# Patient Record
Sex: MALE | Race: WHITE | Employment: FULL TIME | URBAN - METROPOLITAN AREA
[De-identification: names, ages, dates, MRNs, and addresses within clinical notes are randomized per-mention and may not be internally consistent; named-entity substitution may affect disease eponyms.]

---

## 2017-01-20 ENCOUNTER — OFFICE VISIT (OUTPATIENT)
Dept: INTERNAL MEDICINE CLINIC | Age: 57
End: 2017-01-20

## 2017-01-20 VITALS
WEIGHT: 170 LBS | HEIGHT: 74 IN | TEMPERATURE: 97.5 F | DIASTOLIC BLOOD PRESSURE: 79 MMHG | SYSTOLIC BLOOD PRESSURE: 120 MMHG | HEART RATE: 65 BPM | OXYGEN SATURATION: 100 % | RESPIRATION RATE: 16 BRPM | BODY MASS INDEX: 21.82 KG/M2

## 2017-01-20 DIAGNOSIS — Z00.00 ANNUAL PHYSICAL EXAM: Primary | ICD-10-CM

## 2017-01-20 NOTE — PROGRESS NOTES
1. Have you been to the ER, urgent care clinic since your last visit? Hospitalized since your last visit?no    2. Have you seen any other [facility] of [facility] since your last visit?no

## 2017-01-20 NOTE — PROGRESS NOTES
SUBJECTIVE:   Luccallume Paula. Ava Stiles is a 49 y.o. male who is here for complete physical exam. Pt is fasting today. Pt report feeling like there is something in his eyes. Pt endorses seeing ophthalmology where he was dx with meibomian gland disorder. Pt states he has been using a prescription eye drop QID x one year. Pt notes his eyes continue to feel dry despite using the medication. Pt denies f/u with ophthalmology. Pt notes his ophthalmologist has retired, but another physician has taken over his patients. Pt reports issues using contacts. Pt is asking about macular degeneration. Pt denies palpitations or cp. Pt saw Dr. Jax Lo (cardiology) in 2015. Pt claims he cannot recall them requiring a f/u. Pt reports receiving a large bill due to XR from last year because his XR was done at the hospital. Pt recalls being told that the hospital was the most expensive place to have an XR. Pt is requesting future XRs not be done at the hospital.     Pt is asking about normal screening for TB. Pt denies known exposure. Pt states he cannot remember his last screening for PSA. Pt reports being dx with sinusitis on 11/29/16. Pt notes he still has a mild cough. Pt states he has a mole near his right temple that he is concerned about. Pt reports drinking lots of craft beers. PREVENTIVE:  Colonoscopy: due 12/18/2017, Dr. Shruthi Ryan. PSA: ordered today  Tdap: may have been done at an ED, but pt cannot remember the date. Zostavax. Prescribed today    Hep C: ordered today    Pt specifically denies changes in vision or hearing, trouble with swallowing or taste, CP, SOB, heartburn or upset stomach, change in bowel habits, problems urinating, unusual joint or muscle pains, numbness or tingling in extremities, or skin lesions of concern. At this time, he is otherwise doing well and has brought no other complaints to my attention today.  For a list of the medical issues addressed today, see the assessment and plan below. PMH:   Past Medical History   Diagnosis Date    Calculus of kidney     Heart failure ([facility] [address]     Hyperlipidemia 6/24/2010    Hyperlipidemia 6/24/2010    Migraine 6/1/2011    Nephrolithiasis 5/26/2010    Other ill-defined conditions(799.89)      etoh abuse    PAF (paroxysmal atrial fibrillation) (HCC)     Proteinuria 5/26/2010    Psychiatric disorder      depression       PSH:  has a past surgical history that includes cardiac surg procedure unlist and orthopaedic. Allergies: is allergic to pollen extracts. Meds:   Current Outpatient Prescriptions   Medication Sig    varicella zoster vacine live (ZOSTAVAX) 19,400 unit/0.65 mL susr injection 1 Vial by SubCUTAneous route once for 1 dose.  diph,Pertuss,Acell,,Tet Vac-PF (ADACEL) 2 Lf-(2.5-5-3-5 mcg)-5Lf/0.5 mL susp 0.5 mL by IntraMUSCular route once for 1 dose.  DOCOSAHEXANOIC ACID/EPA (FISH OIL PO) Take  by mouth.  ibuprofen (MOTRIN) 200 mg tablet Take  by mouth.  acetaminophen (TYLENOL) 500 mg tablet Take  by mouth every six (6) hours as needed for Pain. No current facility-administered medications for this visit. Fam hx: family history includes Bleeding Prob in his mother; Heart Attack in his father; Heart Disease (age of onset: 62) in his father; Parkinsonism in his father. Soc hx:  reports that he has never smoked. He has never used smokeless tobacco. He reports that he drinks about 3.0 oz of alcohol per week  He reports that he does not use illicit drugs.       Review of Systems - History obtained from the patient  General ROS: negative  Psychological ROS: negative  Ophthalmic ROS: negative  ENT ROS: negative  Respiratory ROS: no cough, shortness of breath, or wheezing  Cardiovascular ROS: no chest pain or dyspnea on exertion  Gastrointestinal ROS: no abdominal pain, change in bowel habits, or black or bloody stools  Genito-Urinary ROS: negative  Musculoskeletal ROS: negative  Neurological ROS: negative  Dermatological ROS: negative    OBJECTIVE:   Vitals:   Visit Vitals    /79 (BP 1 Location: Left arm, BP Patient Position: Sitting)    Pulse 65    Temp 97.5 °F (36.4 °C) (Oral)    Resp 16    Ht 6' 1.5\" (1.867 m)    Wt 170 lb (77.1 kg)    SpO2 100%    BMI 22.12 kg/m2     Gen: Pleasant 49 y.o. male in NAD. HEENT: PERRLA. EOMI. OP moist and pink. EARS: TMs normal and canals equal bilaterally. NECK: Supple. No LAD. No thyromegaly. HEART: RRR, No M/G/R.   LUNGS: CTAB No W/R. ABDOMEN: S, NT, ND, BS+. EXTREMITIES: Warm. No C/C/E.  MUSCULOSKELETAL: Normal ROM, muscle strength 5/5 all groups. NEURO: Alert and oriented x 3. Cranial nerves grossly intact. No focal sensory or motor deficits noted. SKIN: Warm. Dry. No rashes or other lesions noted. ASSESSMENT/ PLAN:     Chon Keane was seen today for physical, other and other. Diagnoses and all orders for this visit:    Annual physical exam  -     CBC WITH AUTOMATED DIFF  -     LIPID PANEL  -     METABOLIC PANEL, COMPREHENSIVE  -     T4, FREE  -     TSH 3RD GENERATION  -     PSA - PROSTATE SPECIFIC AG  -     HEPATITIS C AB  -     REFERRAL TO OPHTHALMOLOGY  -     varicella zoster vacine live (ZOSTAVAX) 19,400 unit/0.65 mL susr injection; 1 Vial by SubCUTAneous route once for 1 dose.  -     diph,Pertuss,Acell,,Tet Vac-PF (ADACEL) 2 Lf-(2.5-5-3-5 mcg)-5Lf/0.5 mL susp; 0.5 mL by IntraMUSCular route once for 1 dose.  -     REFERRAL TO DERMATOLOGY      Pt was advised to f/u with ophthalmology for macular degeneration evaluation. I prescribed Adacel and Zostavax for routine vaccination. Pt was instructed to ask about the cost of these immunizations at his pharmacy before he receives them. Pt was referred to Dr. Hossein Morton (dermatology) for skin exam.     I prescribed Zostavax for routine vaccination. Pt was referred to Dr. Brittany Starks (ophtahlmology). Kizzie Babinski Chuck's physical exam was normal and urinalysis was clear.  Pt was given lab orders for a CBC, CMP, lipid panel, T4, Hep C, PSA, and TSH to have done when he is fasting. Pt will f/u in one year for CPE/fasing labs. Follow-up Disposition:  Return in about 1 year (around 1/20/2018) for CPE/fasting lab. I have reviewed the patient's medications and risks/side effects/benefits were discussed. Diagnosis(-es) explained to patient and questions answered. Literature provided where appropriate.      Written by Yomaira Grimaldo, as dictated by Brian Mclaughlin MD.

## 2017-01-20 NOTE — MR AVS SNAPSHOT
Visit Information Date & Time Provider Department Dept. Phone Encounter #  
 1/20/2017 12:00 PM Jamari Grullon, [address] [place] [address] [id number] Follow-up Instructions Return in about 1 year (around 1/20/2018) for CPE/fasting lab. Upcoming Health Maintenance Date Due Hepatitis C Screening 1960 DTaP/Tdap/Td series (1 - Tdap) 9/28/1983 INFLUENZA AGE 9 TO ADULT 8/1/2016 COLONOSCOPY 12/18/2017 Allergies as of 1/20/2017  Review Complete On: 1/20/2017 By: Jamari Grullon MD  
  
 Severity Noted Reaction Type Reactions Pollen Extracts  05/26/2010    Runny Nose Current Immunizations  Reviewed on 6/23/2012 No immunizations on file. Not reviewed this visit You Were Diagnosed With   
  
 Codes Comments Annual physical exam    -  Primary ICD-10-CM: Z00.00 ICD-9-CM: V70.0 Vitals BP Pulse Temp Resp Height(growth percentile) Weight(growth percentile) 120/79 (BP 1 Location: Left arm, BP Patient Position: Sitting) 65 97.5 °F (36.4 °C) (Oral) 16 6' 1.5\" (1.867 m) 170 lb (77.1 kg) SpO2 BMI Smoking Status 100% 22.12 kg/m2 Never Smoker BMI and BSA Data Body Mass Index Body Surface Area  
 22.12 kg/m 2 2 m 2 Preferred Pharmacy Pharmacy Name Phone [id number] [place], [facility] [phone] Your Updated Medication List  
  
   
This list is accurate as of: 1/20/17  1:09 PM.  Always use your most recent med list.  
  
  
  
  
 acetaminophen 500 mg tablet Commonly known as:  TYLENOL Take  by mouth every six (6) hours as needed for Pain. diph,Pertuss(Acell),Tet Vac-PF 2 Lf-(2.5-5-3-5 mcg)-5Lf/0.5 mL susp Commonly known as:  ADACEL  
0.5 mL by IntraMUSCular route once for 1 dose. FISH OIL PO Take  by mouth. ibuprofen 200 mg tablet Commonly known as:  MOTRIN Take  by mouth. varicella zoster vacine live 19,400 unit/0.65 mL Susr injection Commonly known as:  ZOSTAVAX  
1 Vial by SubCUTAneous route once for 1 dose. Prescriptions Sent to Pharmacy Refills  
 varicella zoster vacine live (ZOSTAVAX) 19,400 unit/0.65 mL susr injection 0 Si Vial by SubCUTAneous route once for 1 dose. Class: Normal  
 Pharmacy: [id number]- [address] Ph #: [phone] Route: SubCUTAneous diph,Pertuss,Acell,,Tet Vac-PF (ADACEL) 2 Lf-(2.5-5-3-5 mcg)-5Lf/0.5 mL susp 0 Si.5 mL by IntraMUSCular route once for 1 dose. Class: Normal  
 Pharmacy: [facility] #[id number]- [address] Ph #: [phone] Route: IntraMUSCular We Performed the Following CBC WITH AUTOMATED DIFF [45616 CPT(R)] HEPATITIS C AB [93467 CPT(R)] LIPID PANEL [73317 CPT(R)] METABOLIC PANEL, COMPREHENSIVE [82883 CPT(R)] PSA DIAGNOSTIC (PROSTATIC SPECIFIC AG) [id number] CPT(R)] REFERRAL TO DERMATOLOGY [REF19 Custom] Comments:  
 Please evaluate patient for atypical nevi REFERRAL TO OPHTHALMOLOGY [REF57 Custom] Comments:  
 Please evaluate patient for dry eyes. T4, FREE [id number] CPT(R)] TSH 3RD GENERATION [24447 CPT(R)] Follow-up Instructions Return in about 1 year (around 2018) for CPE/fasting lab. Referral Information Referral ID Referred By Referred To  
  
 [id number] [facility] [address], [address] [place] [address] Phone: [phone] Fax: [phone] Visits Status Start Date End Date 1 New Request 17 If your referral has a status of pending review or denied, additional information will be sent to support the outcome of this decision.   
 Referral ID Referred By Referred To  
 [id number] Cristian Eaton MD  
 [facility] [place] Phone: [phone] Fax: [phone] Visits Status Start Date End Date 1 New Request 1/20/17 1/20/18 If your referral has a status of pending review or denied, additional information will be sent to support the outcome of this decision. Introducing [facility]! Dear Perla Villagomez: Thank you for requesting a [employer] account. Our records indicate that you already have an active [employer] account. You can access your account anytime at [email or web address] Did you know that you can access your hospital and ER discharge instructions at any time in [employer]? You can also review all of your test results from your hospital stay or ER visit. Additional Information If you have questions, please visit the Frequently Asked Questions section of the [employer] website at [email or web address] Remember, [employer] is NOT to be used for urgent needs. For medical emergencies, dial 911. Now available from your iPhone and Android! Please provide this summary of care documentation to your next provider. Your primary care clinician is listed as Yosvany Valenzuela. If you have any questions after today's visit, please call [phone].

## 2017-01-21 LAB
ALBUMIN SERPL-MCNC: 4.7 G/DL (ref 3.5–5.5)
ALBUMIN/GLOB SERPL: 2 {RATIO} (ref 1.1–2.5)
ALP SERPL-CCNC: 39 IU/L (ref 39–117)
ALT SERPL-CCNC: 23 IU/L (ref 0–44)
AST SERPL-CCNC: 18 IU/L (ref 0–40)
BASOPHILS # BLD AUTO: 0 X10E3/UL (ref 0–0.2)
BASOPHILS NFR BLD AUTO: 1 %
BILIRUB SERPL-MCNC: 0.8 MG/DL (ref 0–1.2)
BUN SERPL-MCNC: 16 MG/DL (ref 6–24)
BUN/CREAT SERPL: 19 (ref 9–20)
CALCIUM SERPL-MCNC: 9.5 MG/DL (ref 8.7–10.2)
CHLORIDE SERPL-SCNC: 102 MMOL/L (ref 96–106)
CHOLEST SERPL-MCNC: 232 MG/DL (ref 100–199)
CO2 SERPL-SCNC: 26 MMOL/L (ref 18–29)
CREAT SERPL-MCNC: 0.85 MG/DL (ref 0.76–1.27)
EOSINOPHIL # BLD AUTO: 0.2 X10E3/UL (ref 0–0.4)
EOSINOPHIL NFR BLD AUTO: 4 %
ERYTHROCYTE [DISTWIDTH] IN BLOOD BY AUTOMATED COUNT: 13.2 % (ref 12.3–15.4)
GLOBULIN SER CALC-MCNC: 2.3 G/DL (ref 1.5–4.5)
GLUCOSE SERPL-MCNC: 83 MG/DL (ref 65–99)
HCT VFR BLD AUTO: 42.7 % (ref 37.5–51)
HCV AB S/CO SERPL IA: <0.1 S/CO RATIO (ref 0–0.9)
HDLC SERPL-MCNC: 59 MG/DL
HGB BLD-MCNC: 14.2 G/DL (ref 12.6–17.7)
IMM GRANULOCYTES # BLD: 0 X10E3/UL (ref 0–0.1)
IMM GRANULOCYTES NFR BLD: 0 %
LDLC SERPL CALC-MCNC: 159 MG/DL (ref 0–99)
LYMPHOCYTES # BLD AUTO: 1.7 X10E3/UL (ref 0.7–3.1)
LYMPHOCYTES NFR BLD AUTO: 33 %
MCH RBC QN AUTO: 30 PG (ref 26.6–33)
MCHC RBC AUTO-ENTMCNC: 33.3 G/DL (ref 31.5–35.7)
MCV RBC AUTO: 90 FL (ref 79–97)
MONOCYTES # BLD AUTO: 0.4 X10E3/UL (ref 0.1–0.9)
MONOCYTES NFR BLD AUTO: 8 %
NEUTROPHILS # BLD AUTO: 2.8 X10E3/UL (ref 1.4–7)
NEUTROPHILS NFR BLD AUTO: 54 %
PLATELET # BLD AUTO: 296 X10E3/UL (ref 150–379)
POTASSIUM SERPL-SCNC: 5.2 MMOL/L (ref 3.5–5.2)
PROT SERPL-MCNC: 7 G/DL (ref 6–8.5)
PSA SERPL-MCNC: 2.4 NG/ML (ref 0–4)
RBC # BLD AUTO: 4.74 X10E6/UL (ref 4.14–5.8)
SODIUM SERPL-SCNC: 142 MMOL/L (ref 134–144)
T4 FREE SERPL-MCNC: 1.36 NG/DL (ref 0.82–1.77)
TRIGL SERPL-MCNC: 72 MG/DL (ref 0–149)
TSH SERPL DL<=0.005 MIU/L-ACNC: 2.05 UIU/ML (ref 0.45–4.5)
VLDLC SERPL CALC-MCNC: 14 MG/DL (ref 5–40)
WBC # BLD AUTO: 5.1 X10E3/UL (ref 3.4–10.8)

## 2017-01-27 NOTE — PROGRESS NOTES
Called and spoke with pt informing labs normal except for lipid panel;     Pt stated he saw his results on MyChart and has discussed this with his wife and would prefer to work on diet and exercise. Pt requesting call back if Dr. Clement White would like for him to have repeat lipid panel in the next 3-6 months to see if there has been improvement. Pt advised question will be forwarded to Dr. Clement White and will let him know if she wishes to order future repeat lab study.

## 2017-01-27 NOTE — PROGRESS NOTES
All results are in the normal range except for his lipid panel. Levels are higher than last year. Is he willing to take a medication?

## 2017-02-07 ENCOUNTER — OFFICE VISIT (OUTPATIENT)
Dept: INTERNAL MEDICINE CLINIC | Age: 57
End: 2017-02-07

## 2017-02-07 VITALS
WEIGHT: 167 LBS | OXYGEN SATURATION: 98 % | RESPIRATION RATE: 18 BRPM | DIASTOLIC BLOOD PRESSURE: 76 MMHG | SYSTOLIC BLOOD PRESSURE: 109 MMHG | HEART RATE: 75 BPM | HEIGHT: 74 IN | TEMPERATURE: 98 F | BODY MASS INDEX: 21.43 KG/M2

## 2017-02-07 DIAGNOSIS — J11.1 INFLUENZA: Primary | ICD-10-CM

## 2017-02-07 RX ORDER — OSELTAMIVIR PHOSPHATE 75 MG/1
75 CAPSULE ORAL 2 TIMES DAILY
Qty: 10 CAP | Refills: 0 | Status: SHIPPED | OUTPATIENT
Start: 2017-02-07 | End: 2017-02-12

## 2017-02-07 RX ORDER — AZITHROMYCIN 250 MG/1
250 TABLET, FILM COATED ORAL SEE ADMIN INSTRUCTIONS
Qty: 6 TAB | Refills: 0 | Status: SHIPPED | OUTPATIENT
Start: 2017-02-07 | End: 2017-02-12

## 2017-02-07 RX ORDER — HYDROCODONE POLISTIREX AND CHLORPHENIRAMINE POLISTIREX 10; 8 MG/5ML; MG/5ML
1 SUSPENSION, EXTENDED RELEASE ORAL
Qty: 160 ML | Refills: 0 | Status: SHIPPED | OUTPATIENT
Start: 2017-02-07 | End: 2017-06-12

## 2017-02-07 NOTE — PROGRESS NOTES
1. Have you been to the ER, urgent care clinic since your last visit? Hospitalized since your last visit?no    2. Have you seen or consulted any other health care providers outside of the [address] [place] [address] since your last visit? Include any pap smears or colon screening.  no

## 2017-02-07 NOTE — PROGRESS NOTES
SUBJECTIVE:   Mr. Brandt Galeas is a 49 y.o. male who is here c/o fever. Pt c/o nasal drainage, nasal congestion, sneezing, cough, HA, fever, and body aches x 4 days. Pt claims his wife is not showing signs of the flu, but has allergies. Pt reports episodes of nausea. Pt denies getting a flu vaccine. At this time, he is otherwise doing well and has brought no other complaints to my attention today. For a list of the medical issues addressed today, see the assessment and plan below. PMH:   Past Medical History   Diagnosis Date    Calculus of kidney     Heart failure ([facility] [address]     Hyperlipidemia 6/24/2010    Hyperlipidemia 6/24/2010    Migraine 6/1/2011    Nephrolithiasis 5/26/2010    Other ill-defined conditions(799.89)      etoh abuse    PAF (paroxysmal atrial fibrillation) (HCC)     Proteinuria 5/26/2010    Psychiatric disorder      depression     PSH:  has a past surgical history that includes cardiac surg procedure unlist and orthopaedic. All: is allergic to pollen extracts. MEDS:   Current Outpatient Prescriptions   Medication Sig    oseltamivir (TAMIFLU) 75 mg capsule Take 1 Cap by mouth two (2) times a day for 5 days. Indications: INFLUENZA    azithromycin (ZITHROMAX) 250 mg tablet Take 1 Tab by mouth See Admin Instructions for 5 days.  chlorpheniramine-HYDROcodone (TUSSIONEX) 10-8 mg/5 mL suspension Take 5 mL by mouth every twelve (12) hours as needed for Cough. Max Daily Amount: 10 mL.  DOCOSAHEXANOIC ACID/EPA (FISH OIL PO) Take  by mouth.  ibuprofen (MOTRIN) 200 mg tablet Take  by mouth.  acetaminophen (TYLENOL) 500 mg tablet Take  by mouth every six (6) hours as needed for Pain. No current facility-administered medications for this visit. FH: family history includes Bleeding Prob in his mother; Heart Attack in his father; Heart Disease (age of onset: 62) in his father; Parkinsonism in his father. SH:  reports that he has never smoked.  He has never used smokeless tobacco. He reports that he drinks about 3.0 oz of alcohol per week  He reports that he does not use illicit drugs. Review of Systems - History obtained from the patient  General ROS: +HAs, +fever, +body aches, otherwise negative  Psychological ROS: negative  Ophthalmic ROS: negative  ENT ROS: +nasal drainage, +nasal congestion, +sneezing, otherwise negative  Respiratory ROS: +cough, no shortness of breath, or wheezing  Cardiovascular ROS: no chest pain or dyspnea on exertion  Gastrointestinal ROS: no abdominal pain, change in bowel habits, or black or bloody stools  Genito-Urinary ROS: negative  Musculoskeletal ROS: negative  Neurological ROS: negative  Dermatological ROS: negative    OBJECTIVE:   Vitals:   Visit Vitals    /76 (BP 1 Location: Left arm, BP Patient Position: Sitting)    Pulse 75    Temp 98 °F (36.7 °C) (Oral)    Resp 18    Ht 6' 1.5\" (1.867 m)    Wt 167 lb (75.8 kg)    SpO2 98%    BMI 21.73 kg/m2      Gen: Pleasant 49 y.o.  male in NAD. HEENT: NC/AT. HEAD: No maxillary or frontal sinus tenderness. EYES: PERRLA. EOMI. EARS: TM's normal and canals equal bilaterally. No erythema or bulging. OP moist and pink. NARES: Mucosa pink and turbinates normal bilaterally. THROAT: No erythema or exudate. NECK: Supple. No LAD. No thyromegaly. HEART: RRR, No M/G/R. LUNGS: CTAB No W/R. EXTREMITIES: Warm. No C/C/E. NEURO: Alert and oriented x 3. Cranial nerves grossly intact. No focal sensory or motor deficits noted. SKIN: Warm. Dry. No rashes or other lesions noted. ASSESSMENT/ PLAN:     Adalgisa Rangel was seen today for uri and other. Diagnoses and all orders for this visit:    Influenza  -     oseltamivir (TAMIFLU) 75 mg capsule; Take 1 Cap by mouth two (2) times a day for 5 days. Indications: INFLUENZA  -     azithromycin (ZITHROMAX) 250 mg tablet; Take 1 Tab by mouth See Admin Instructions for 5 days. -     chlorpheniramine-HYDROcodone (TUSSIONEX) 10-8 mg/5 mL suspension;  Take 5 mL by mouth every twelve (12) hours as needed for Cough. Max Daily Amount: 10 mL. I ordered a rapid flu that returned positive. I prescribed Tamiflu, a Z-pack, and Tussionex 5ml BID prn for cough for management of flu. Pt may use Robitussin cough syrup during the day and Tussionex at night. Pt was given a note to return to work on 2/13/17. Pt was instructed to rest and hydrate. I also prescribed Tamiflu daily for pt's wife. Pt will f/u if symptoms worsen or fail to improve. Follow-up Disposition:  Return if symptoms worsen or fail to improve. I have reviewed the patient's medications and risks/side effects/benefits were discussed. Diagnosis(-es) explained to patient and questions answered. Literature provided where appropriate.      Written by Beata Reese, as dictated by Deon Johnson MD.

## 2017-02-07 NOTE — LETTER
NOTIFICATION RETURN TO WORK / SCHOOL 
 
2/7/2017 1:10 PM 
 
Mr. Savage Maguire [address] [id number] To Whom It May Concern: 
 
Savage Maguire is currently under the care of [facility]. He will return to work/school on: 2/13/17. If there are questions or concerns please have the patient contact our office. Sincerely, Casie Hinojosa MD

## 2017-06-12 ENCOUNTER — HOSPITAL ENCOUNTER (EMERGENCY)
Age: 57
Discharge: HOME | End: 2017-06-12
Attending: EMERGENCY MEDICINE
Payer: COMMERCIAL

## 2017-06-12 ENCOUNTER — APPOINTMENT (OUTPATIENT)
Dept: GENERAL RADIOLOGY | Age: 57
End: 2017-06-12
Attending: EMERGENCY MEDICINE
Payer: COMMERCIAL

## 2017-06-12 VITALS
TEMPERATURE: 97.9 F | DIASTOLIC BLOOD PRESSURE: 55 MMHG | RESPIRATION RATE: 18 BRPM | HEIGHT: 74 IN | WEIGHT: 159.83 LBS | OXYGEN SATURATION: 98 % | HEART RATE: 60 BPM | BODY MASS INDEX: 20.51 KG/M2 | SYSTOLIC BLOOD PRESSURE: 115 MMHG

## 2017-06-12 DIAGNOSIS — R07.89 ATYPICAL CHEST PAIN: Primary | ICD-10-CM

## 2017-06-12 LAB
ALBUMIN SERPL BCP-MCNC: 3.6 G/DL (ref 3.5–5)
ALBUMIN/GLOB SERPL: 1 {RATIO} (ref 1.1–2.2)
ALP SERPL-CCNC: 47 U/L (ref 45–117)
ALT SERPL-CCNC: 35 U/L (ref 12–78)
ANION GAP BLD CALC-SCNC: 6 MMOL/L (ref 5–15)
AST SERPL W P-5'-P-CCNC: 24 U/L (ref 15–37)
ATRIAL RATE: 67 BPM
BASOPHILS # BLD AUTO: 0 K/UL (ref 0–0.1)
BASOPHILS # BLD: 0 % (ref 0–1)
BILIRUB SERPL-MCNC: 0.7 MG/DL (ref 0.2–1)
BUN SERPL-MCNC: 17 MG/DL (ref 6–20)
BUN/CREAT SERPL: 17 (ref 12–20)
CALCIUM SERPL-MCNC: 8.7 MG/DL (ref 8.5–10.1)
CALCULATED P AXIS, ECG09: 80 DEGREES
CALCULATED R AXIS, ECG10: 98 DEGREES
CALCULATED T AXIS, ECG11: 44 DEGREES
CHLORIDE SERPL-SCNC: 105 MMOL/L (ref 97–108)
CK MB CFR SERPL CALC: 1.3 % (ref 0–2.5)
CK MB SERPL-MCNC: 1.5 NG/ML (ref 5–25)
CK SERPL-CCNC: 117 U/L (ref 39–308)
CO2 SERPL-SCNC: 28 MMOL/L (ref 21–32)
CREAT SERPL-MCNC: 0.99 MG/DL (ref 0.7–1.3)
D DIMER PPP FEU-MCNC: 0.4 MG/L FEU (ref 0–0.65)
DIAGNOSIS, 93000: NORMAL
EOSINOPHIL # BLD: 0.2 K/UL (ref 0–0.4)
EOSINOPHIL NFR BLD: 3 % (ref 0–7)
ERYTHROCYTE [DISTWIDTH] IN BLOOD BY AUTOMATED COUNT: 12.9 % (ref 11.5–14.5)
GLOBULIN SER CALC-MCNC: 3.7 G/DL (ref 2–4)
GLUCOSE SERPL-MCNC: 90 MG/DL (ref 65–100)
HCT VFR BLD AUTO: 43.3 % (ref 36.6–50.3)
HGB BLD-MCNC: 15.3 G/DL (ref 12.1–17)
LIPASE SERPL-CCNC: 214 U/L (ref 73–393)
LYMPHOCYTES # BLD AUTO: 21 % (ref 12–49)
LYMPHOCYTES # BLD: 1.6 K/UL (ref 0.8–3.5)
MCH RBC QN AUTO: 30.2 PG (ref 26–34)
MCHC RBC AUTO-ENTMCNC: 35.3 G/DL (ref 30–36.5)
MCV RBC AUTO: 85.6 FL (ref 80–99)
MONOCYTES # BLD: 0.4 K/UL (ref 0–1)
MONOCYTES NFR BLD AUTO: 6 % (ref 5–13)
NEUTS SEG # BLD: 5.1 K/UL (ref 1.8–8)
NEUTS SEG NFR BLD AUTO: 70 % (ref 32–75)
P-R INTERVAL, ECG05: 138 MS
PLATELET # BLD AUTO: 258 K/UL (ref 150–400)
POTASSIUM SERPL-SCNC: 4.1 MMOL/L (ref 3.5–5.1)
PROT SERPL-MCNC: 7.3 G/DL (ref 6.4–8.2)
Q-T INTERVAL, ECG07: 410 MS
QRS DURATION, ECG06: 114 MS
QTC CALCULATION (BEZET), ECG08: 433 MS
RBC # BLD AUTO: 5.06 M/UL (ref 4.1–5.7)
SODIUM SERPL-SCNC: 139 MMOL/L (ref 136–145)
TROPONIN I SERPL-MCNC: <0.04 NG/ML
TROPONIN I SERPL-MCNC: <0.04 NG/ML
VENTRICULAR RATE, ECG03: 67 BPM
WBC # BLD AUTO: 7.3 K/UL (ref 4.1–11.1)

## 2017-06-12 PROCEDURE — 80053 COMPREHEN METABOLIC PANEL: CPT | Performed by: EMERGENCY MEDICINE

## 2017-06-12 PROCEDURE — 85025 COMPLETE CBC W/AUTO DIFF WBC: CPT | Performed by: EMERGENCY MEDICINE

## 2017-06-12 PROCEDURE — 85379 FIBRIN DEGRADATION QUANT: CPT | Performed by: EMERGENCY MEDICINE

## 2017-06-12 PROCEDURE — 83690 ASSAY OF LIPASE: CPT | Performed by: EMERGENCY MEDICINE

## 2017-06-12 PROCEDURE — 71020 XR CHEST PA LAT: CPT

## 2017-06-12 PROCEDURE — 82550 ASSAY OF CK (CPK): CPT | Performed by: EMERGENCY MEDICINE

## 2017-06-12 PROCEDURE — 82553 CREATINE MB FRACTION: CPT | Performed by: EMERGENCY MEDICINE

## 2017-06-12 PROCEDURE — 84484 ASSAY OF TROPONIN QUANT: CPT | Performed by: EMERGENCY MEDICINE

## 2017-06-12 PROCEDURE — 93005 ELECTROCARDIOGRAM TRACING: CPT

## 2017-06-12 PROCEDURE — 36415 COLL VENOUS BLD VENIPUNCTURE: CPT | Performed by: EMERGENCY MEDICINE

## 2017-06-12 PROCEDURE — 99284 EMERGENCY DEPT VISIT MOD MDM: CPT

## 2017-06-12 RX ORDER — PANTOPRAZOLE SODIUM 40 MG/10ML
40 INJECTION, POWDER, LYOPHILIZED, FOR SOLUTION INTRAVENOUS
Status: DISCONTINUED | OUTPATIENT
Start: 2017-06-12 | End: 2017-06-12

## 2017-06-12 RX ORDER — LIDOCAINE HYDROCHLORIDE 20 MG/ML
10 SOLUTION OROPHARYNGEAL
Status: DISCONTINUED | OUTPATIENT
Start: 2017-06-12 | End: 2017-06-12

## 2017-06-12 NOTE — DISCHARGE INSTRUCTIONS
Chest Pain: Care Instructions  Your Care Instructions  There are many things that can cause chest pain. Some are not serious and will get better on their own in a few days. But some kinds of chest pain need more testing and treatment. Your doctor may have recommended a follow-up visit in the next 8 to 12 hours. If you are not getting better, you may need more tests or treatment. Even though your doctor has released you, you still need to watch for any problems. The doctor carefully checked you, but sometimes problems can develop later. If you have new symptoms or if your symptoms do not get better, get medical care right away. If you have worse or different chest pain or pressure that lasts more than 5 minutes or you passed out (lost consciousness), call 911 or seek other emergency help right away. A medical visit is only one step in your treatment. Even if you feel better, you still need to do what your doctor recommends, such as going to all suggested follow-up appointments and taking medicines exactly as directed. This will help you recover and help prevent future problems. How can you care for yourself at home? · Rest until you feel better. · Take your medicine exactly as prescribed. Call your doctor if you think you are having a problem with your medicine. · Do not drive after taking a prescription pain medicine. When should you call for help? Call 911 if:  · You passed out (lost consciousness). · You have severe difficulty breathing. · You have symptoms of a heart attack. These may include:  ¨ Chest pain or pressure, or a strange feeling in your chest.  ¨ Sweating. ¨ Shortness of breath. ¨ Nausea or vomiting. ¨ Pain, pressure, or a strange feeling in your back, neck, jaw, or upper belly or in one or both shoulders or arms. ¨ Lightheadedness or sudden weakness. ¨ A fast or irregular heartbeat.   After you call 911, the  may tell you to chew 1 adult-strength or 2 to 4 low-dose aspirin. Wait for an ambulance. Do not try to drive yourself. Call your doctor today if:  · You have any trouble breathing. · Your chest pain gets worse. · You are dizzy or lightheaded, or you feel like you may faint. · You are not getting better as expected. · You are having new or different chest pain. Where can you learn more? Go to http://hu-wild.info/. Enter A120 in the search box to learn more about \"Chest Pain: Care Instructions. \"  Current as of: May 27, 2016  Content Version: 11.2  © 9752-9776 [employer]. Care instructions adapted under license by [employer] (which disclaims liability or warranty for this information). If you have questions about a medical condition or this instruction, always ask your healthcare professional. [address] any warranty or liability for your use of this information.

## 2017-06-12 NOTE — ED PROVIDER NOTES
HPI Comments: Jonathon Braden is a 49 y.o. male with PMHx significant for Heart failure and hyperlipidemia, who presents ambulatory to ED [facility] ED with cc of nonradiating left chest pain since 5 AM today. Pt also complains of associated SOB, generalized shaking and left calf pain. Chest discomfort has improving since waking. He denies chest pain in the last few weeks, but has experienced similar symptoms that were more severe associated with PAF five years ago. Pt notes he had a surgery for coarctation of the aorta when he was 10, and ever since has regular cardiology follow up. He denies any hx of MI, abdominal surgeries, or any recent travel. Pt also denies any N/V/D, abdominal pain, weakness or tingling/numbess in face/extremities, diaphoresis, hemoptysis or cough. Ankit Santoro MD    Family Hx: Father had MI at 62  Social Hx: - smoking; - EtOH; - illicit drug use    There are no other complains, changes, or physical findings at this time. The history is provided by the patient. No  was used. Past Medical History:   Diagnosis Date    Calculus of kidney     Heart failure ([address]     Hyperlipidemia 6/24/2010    Hyperlipidemia 6/24/2010    Migraine 6/1/2011    Nephrolithiasis 5/26/2010    Other ill-defined conditions     etoh abuse    PAF (paroxysmal atrial fibrillation) (HCC)     Proteinuria 5/26/2010    Psychiatric disorder     depression       Past Surgical History:   Procedure Laterality Date    CARDIAC SURG PROCEDURE UNLIST      sx for coarctaion of aorta    HX ORTHOPAEDIC      shoulder sx         Family History:   Problem Relation Age of Onset    Bleeding Prob Mother     Parkinsonism Father     Heart Disease Father 62     MI x two    Heart Attack Father        Social History     Social History    Marital status:      Spouse name: N/A    Number of children: N/A    Years of education: N/A     Occupational History    Not on file.      Social History Main Topics    Smoking status: Never Smoker    Smokeless tobacco: Never Used    Alcohol use 8.4 oz/week     14 Cans of beer per week    Drug use: No    Sexual activity: Not Currently     Other Topics Concern    Not on file     Social History Narrative         ALLERGIES: Pollen extracts    Review of Systems   Constitutional: Negative for chills, diaphoresis, fatigue and fever. HENT: Negative for congestion, rhinorrhea and sore throat. Eyes: Negative for pain, discharge and visual disturbance. Respiratory: Positive for shortness of breath. Negative for cough, chest tightness and wheezing. Cardiovascular: Positive for chest pain. Negative for palpitations and leg swelling. Gastrointestinal: Negative for abdominal pain, constipation, diarrhea, nausea and vomiting. Genitourinary: Negative for dysuria, frequency and hematuria. Musculoskeletal: Positive for myalgias (left calf). Negative for arthralgias and back pain. Skin: Negative for rash. Neurological: Negative for dizziness, weakness, light-headedness, numbness and headaches. Psychiatric/Behavioral: Negative. Patient Vitals for the past 12 hrs:   Temp Pulse Resp BP SpO2   06/12/17 1035 97.9 °F (36.6 °C) 75 16 128/76 99 %       Physical Exam   Constitutional: He is oriented to person, place, and time. He appears well-developed and well-nourished. No distress. HENT:   Head: Normocephalic and atraumatic. Eyes: EOM are normal. Right eye exhibits no discharge. Left eye exhibits no discharge. No scleral icterus. Neck: Normal range of motion. Neck supple. No tracheal deviation present. Cardiovascular: Normal rate, regular rhythm, normal heart sounds and intact distal pulses. Exam reveals no gallop and no friction rub. No murmur heard. 2+ radial and DP pulses. Pulmonary/Chest: Effort normal and breath sounds normal. No respiratory distress. He has no wheezes. He has no rales. Abdominal: Soft. He exhibits no distension.  There is no tenderness. Musculoskeletal: Normal range of motion. He exhibits no edema. No calf erythema, edema or tenderness. Lymphadenopathy:     He has no cervical adenopathy. Neurological: He is alert and oriented to person, place, and time. Skin: Skin is warm and dry. No rash noted. Psychiatric: He has a normal mood and affect. Nursing note and vitals reviewed. MDM  Number of Diagnoses or Management Options  Atypical chest pain:   Diagnosis management comments:     Differential includes atypical chest pain, stable angina, unstable angina, MI, atrial fibrillation/flutter, PE, pleurisy, costochondritis, pneumonia, bronchitis, MSK pain. Do not suspect dissection.  - CBC, CMP  - Saulo  - D-dimer to risk stratify for PE, low suspicion  - CXR         Amount and/or Complexity of Data Reviewed  Clinical lab tests: ordered and reviewed  Tests in the radiology section of CPT®: ordered and reviewed  Tests in the medicine section of CPT®: ordered and reviewed  Review and summarize past medical records: yes  Independent visualization of images, tracings, or specimens: yes    Patient Progress  Patient progress: stable      Procedures    EKG interpretation: (Preliminary)  10:23 AM  Rhythm: incomplete RBBB; and regular . Rate (approx.): 67 bpm; Axis: right axis deviation; WY interval: normal; QRS interval: normal ; ST/T wave: normal; Other findings: normal.  This note is prepared by Ulysses Rocks, acting as Scribe for Efrain Du MD.    PROGRESS NOTE:  12:17 PM  Pt has been re-evaluated. Pt is feeling better, denies having any CP. PROGRESS NOTE:  2:00 PM  Labs unremarkable. Saulo negative x 2. Heart score 2 so will discharge with cardiology follow up. Discussed results, prescriptions and follow up plan with patient. Provided customary return to ED instructions. Patient expressed understanding.   Ardena Osler, MD    LABORATORY TESTS:  Recent Results (from the past 12 hour(s))   EKG, 12 LEAD, INITIAL Collection Time: 06/12/17 10:23 AM   Result Value Ref Range    Ventricular Rate 67 BPM    Atrial Rate 67 BPM    P-R Interval 138 ms    QRS Duration 114 ms    Q-T Interval 410 ms    QTC Calculation (Bezet) 433 ms    Calculated P Axis 80 degrees    Calculated R Axis 98 degrees    Calculated T Axis 44 degrees    Diagnosis       Normal sinus rhythm  Possible Left atrial enlargement  Rightward axis  Incomplete right bundle branch block  When compared with ECG of 23-JUN-2012 06:41,  No significant change was found     CBC WITH AUTOMATED DIFF    Collection Time: 06/12/17 10:57 AM   Result Value Ref Range    WBC 7.3 4.1 - 11.1 K/uL    RBC 5.06 4.10 - 5.70 M/uL    HGB 15.3 12.1 - 17.0 g/dL    HCT 43.3 36.6 - 50.3 %    MCV 85.6 80.0 - 99.0 FL    MCH 30.2 26.0 - 34.0 PG    MCHC 35.3 30.0 - 36.5 g/dL    RDW 12.9 11.5 - 14.5 %    PLATELET 274 724 - 242 K/uL    NEUTROPHILS 70 32 - 75 %    LYMPHOCYTES 21 12 - 49 %    MONOCYTES 6 5 - 13 %    EOSINOPHILS 3 0 - 7 %    BASOPHILS 0 0 - 1 %    ABS. NEUTROPHILS 5.1 1.8 - 8.0 K/UL    ABS. LYMPHOCYTES 1.6 0.8 - 3.5 K/UL    ABS. MONOCYTES 0.4 0.0 - 1.0 K/UL    ABS. EOSINOPHILS 0.2 0.0 - 0.4 K/UL    ABS. BASOPHILS 0.0 0.0 - 0.1 K/UL   METABOLIC PANEL, COMPREHENSIVE    Collection Time: 06/12/17 10:57 AM   Result Value Ref Range    Sodium 139 136 - 145 mmol/L    Potassium 4.1 3.5 - 5.1 mmol/L    Chloride 105 97 - 108 mmol/L    CO2 28 21 - 32 mmol/L    Anion gap 6 5 - 15 mmol/L    Glucose 90 65 - 100 mg/dL    BUN 17 6 - 20 MG/DL    Creatinine 0.99 0.70 - 1.30 MG/DL    BUN/Creatinine ratio 17 12 - 20      GFR est AA >60 >60 ml/min/1.73m2    GFR est non-AA >60 >60 ml/min/1.73m2    Calcium 8.7 8.5 - 10.1 MG/DL    Bilirubin, total 0.7 0.2 - 1.0 MG/DL    ALT (SGPT) 35 12 - 78 U/L    AST (SGOT) 24 15 - 37 U/L    Alk.  phosphatase 47 45 - 117 U/L    Protein, total 7.3 6.4 - 8.2 g/dL    Albumin 3.6 3.5 - 5.0 g/dL    Globulin 3.7 2.0 - 4.0 g/dL    A-G Ratio 1.0 (L) 1.1 - 2.2     LIPASE    Collection Time: 06/12/17 10:57 AM   Result Value Ref Range    Lipase 214 73 - 393 U/L   TROPONIN I    Collection Time: 06/12/17 10:57 AM   Result Value Ref Range    Troponin-I, Qt. <0.04 <0.05 ng/mL   D DIMER    Collection Time: 06/12/17 10:57 AM   Result Value Ref Range    D-dimer 0.40 0.00 - 0.65 mg/L FEU   CK-MB,QUANT. Collection Time: 06/12/17 10:57 AM   Result Value Ref Range    CK - MB 1.5 <3.6 NG/ML    CK-MB Index 1.3 0 - 2.5     CK    Collection Time: 06/12/17 10:57 AM   Result Value Ref Range     39 - 308 U/L   TROPONIN I    Collection Time: 06/12/17  1:09 PM   Result Value Ref Range    Troponin-I, Qt. <0.04 <0.05 ng/mL       IMAGING RESULTS:    CXR Results  (Last 48 hours)               06/12/17 1122  XR CHEST PA LAT Final result    Impression:  Impression: No acute process. Narrative:  Exam:  2 view chest       Indication: Chest pain, history of atrial fibrillation       Comparison to 6/19/2012. PA and lateral views demonstrate normal heart size. There is no acute process in   the lung fields. The lungs are hyperinflated. Chronic deformity of the left   fifth rib is unchanged. IMPRESSION:  1. Atypical chest pain        PLAN:  1. There are no discharge medications for this patient. 2.   Follow-up Information     Follow up With Details Comments [address], [place]  Please follow up with cardiology as soon as possible [address] [place] [address]  [phone]      [facility] [place] EM[facility]  As needed, If symptoms worsen [address] [place] [address] Drive  [address] [facility]  [phone]        Return to ED if worse     DISCHARGE NOTE  2:03 PM  The patient has been re-evaluated and is ready for discharge. Reviewed available results with patient. Counseled patient on diagnosis and care plan. Patient has expressed understanding, and all questions have been answered.  Patient agrees with plan and agrees to follow up as recommended, or return to the ED if their symptoms worsen. Discharge instructions have been provided and explained to the patient, along with reasons to return to the ED. ATTESTATION:  This note is prepared by Carlo Kumar, acting as Scribe for Adelfo Aguilar MD.    Adelfo Aguilar MD: The scribe's documentation has been prepared under my direction and personally reviewed by me in its entirety. I confirm that the note above accurately reflects all work, treatment, procedures, and medical decision making performed by me.

## 2018-02-09 ENCOUNTER — TELEPHONE (OUTPATIENT)
Dept: INTERNAL MEDICINE CLINIC | Age: 58
End: 2018-02-09

## 2018-02-09 NOTE — TELEPHONE ENCOUNTER
The following appointments have been successfully scheduled:    Date/time Monday, February 12, 2018 10:00 AM  Patient Regina Strong 1960 ([id number] M) #[id number]  Department [facility]-MAIN OFFICE-[facility]  Appointment type Routine Care  Provider KATIE YOUNG      The above appointment has been scheduled for you. Please contact this office to confirm or cancel this appointment. . Web message was sent and voice mail was left for patient regarding appointment.

## 2018-02-09 NOTE — TELEPHONE ENCOUNTER
#[phone] pt states he is returning call for appt for ED f/u with heart issues. Please call to schedule.

## 2018-02-11 ENCOUNTER — PATIENT MESSAGE (OUTPATIENT)
Dept: INTERNAL MEDICINE CLINIC | Age: 58
End: 2018-02-11

## 2018-02-12 RX ORDER — TRIAMCINOLONE ACETONIDE 1 MG/G
CREAM TOPICAL
Qty: 15 G | Refills: 0 | Status: SHIPPED | OUTPATIENT
Start: 2018-02-12 | End: 2018-03-26 | Stop reason: SDUPTHER

## 2018-02-12 NOTE — TELEPHONE ENCOUNTER
From: Kaylin Mccoy  To: Darius Lopez MD  Sent: 2/11/2018 10:04 AM EST  Subject: Prescription Question    Can i get a refill on this:  triamcinolone acetonide (KENALOG) 0.1 % topical cream Memo Cardona MD]

## 2018-03-01 ENCOUNTER — OFFICE VISIT (OUTPATIENT)
Dept: INTERNAL MEDICINE CLINIC | Age: 58
End: 2018-03-01

## 2018-03-01 VITALS
TEMPERATURE: 97.3 F | HEART RATE: 60 BPM | HEIGHT: 74 IN | BODY MASS INDEX: 21.05 KG/M2 | DIASTOLIC BLOOD PRESSURE: 69 MMHG | WEIGHT: 164 LBS | OXYGEN SATURATION: 100 % | SYSTOLIC BLOOD PRESSURE: 118 MMHG

## 2018-03-01 DIAGNOSIS — N50.89 GENITAL LESION, MALE: Primary | ICD-10-CM

## 2018-03-01 NOTE — PROGRESS NOTES
HISTORY OF PRESENT ILLNESS  Giuliana Knowles is a 56 y.o. male. HPI   C/o recurrent ulcer on penis--mild discomfort x 2weeks ago. Slightly itchy  Occurred last year -resolved after 6 weeks , was not seen by MD at that time-eventually dried  Wife has no lesions  Hx herpetic zack on left finger 1980's    Patient Active Problem List    Diagnosis Date Noted    PAF ([facility]oxysmal [facility] fibrillation) ([facility] [address]     Cerebellar stroke syndrome 06/28/2012    Dizziness 06/19/2012    Persistent vomiting 06/19/2012    Aorta coarctation repair age 8 06/30/2011    Migraine 06/01/2011    Injury of right foot 03/31/2011    Acute bronchitis 01/28/2011    Hyperlipidemia 06/24/2010    Nephrolithiasis 05/26/2010     Current Outpatient Prescriptions   Medication Sig Dispense Refill    triamcinolone acetonide (KENALOG) 0.1 % topical cream use thin layer 15 g 0     Allergies   Allergen Reactions    Pollen Extracts Runny Nose      Lab Results  Component Value Date/Time   Hemoglobin A1c 5.5 06/01/2010 12:00 AM   Glucose 90 06/12/2017 10:57 AM   LDL, calculated 159 (H) 01/20/2017 01:42 PM   Creatinine (POC) 0.9 06/19/2012 09:33 AM   Creatinine 0.99 06/12/2017 10:57 AM      Lab Results  Component Value Date/Time   ALT (SGPT) 35 06/12/2017 10:57 AM   AST (SGOT) 24 06/12/2017 10:57 AM   Alk.  phosphatase 47 06/12/2017 10:57 AM   Bilirubin, direct 0.1 06/21/2012 04:09 AM   Bilirubin, total 0.7 06/12/2017 10:57 AM   Albumin 3.6 06/12/2017 10:57 AM   Protein, total 7.3 06/12/2017 10:57 AM   INR 1.0 06/19/2012 09:39 AM   Prothrombin time 10.4 06/19/2012 09:39 AM   PLATELET 931 56/80/5578 10:57 AM       Lab Results  Component Value Date/Time   GFR est non-AA >60 06/12/2017 10:57 AM   GFRNA, POC >60 06/19/2012 09:33 AM   GFR est AA >60 06/12/2017 10:57 AM   GFRAA, POC >60 06/19/2012 09:33 AM   Creatinine 0.99 06/12/2017 10:57 AM   Creatinine (POC) 0.9 06/19/2012 09:33 AM   BUN 17 06/12/2017 10:57 AM   Sodium 139 06/12/2017 10:57 AM   Potassium 4.1 06/12/2017 10:57 AM   Chloride 105 06/12/2017 10:57 AM   CO2 28 06/12/2017 10:57 AM        ROS    Physical Exam   Cardiovascular: Exam reveals no gallop and no friction rub. No murmur heard. Pulmonary/Chest: No respiratory distress. He has no wheezes. He has no rales. He exhibits no tenderness. Genitourinary:   Genitourinary Comments: Moist superficial ulcer on right shaft of penis  No obvious groin adenopathy       ASSESSMENT and PLAN  Diagnoses and all orders for this visit:    1. Genital lesion, male  -     CULTURE, HSV W/ TYPING  -     HIV 1/2 AG/AB, 4TH GENERATION,W RFLX CONFIRM  -     RPR  -     CHLAMYDIA/GC AMPLIFICATION  -     HSV 1/2 AB, IGM  -     HERPES SIMPLEX VIRUS TYPES 1/2 SPECIFIC AB, IGG; Future    Probable HSV with recurrence ( last year similar event same location  Too late for valtrex--no new lesions since onset 10d ago  Avoid intercourse for now-discussed  Has f/u PCP later this month=Dr Adamaris Liang  Follow-up Disposition:  Return if symptoms worsen or fail to improve.

## 2018-03-01 NOTE — MR AVS SNAPSHOT
[address] [facility] 
[phone] Patient: Len Sauceda MRN: [id number] Visit Information Date & Time Provider Department Dept. Phone Encounter #  
 3/1/2018  8:30 AM Mindy Shields, [address] [place] [address],[address] [phone] [id number] Follow-up Instructions Return if symptoms worsen or fail to improve. Your Appointments 3/26/2018  3:45 PM  
ROUTINE CARE with Dl Hussein MD  
[facility]) Appt Note: ed follow up and medication evaluation; r/s â¢ed follow up and medication evaluation cross 02/09/18  
 [address] [facility] [address] [id number]  
[address] [place] [address] [address] [facility] Upcoming Health Maintenance Date Due DTaP/Tdap/Td series (1 - Tdap) 9/28/1983 Influenza Age 5 to Adult 8/1/2017 COLONOSCOPY 12/18/2017 Allergies as of 3/1/2018  Review Complete On: 3/1/2018 By: Ever Parra LPN Severity Noted Reaction Type Reactions Pollen Extracts  05/26/2010    Runny Nose Current Immunizations  Reviewed on 6/23/2012 No immunizations on file. Not reviewed this visit You Were Diagnosed With   
  
 Codes Comments Genital lesion, male    -  Primary ICD-10-CM: N50.89 ICD-9-CM: 608.89 Vitals BP Pulse Temp Height(growth percentile) Weight(growth percentile) SpO2  
 118/69 (BP 1 Location: Left arm, BP Patient Position: Sitting) 60 97.3 °F (36.3 °C) (Oral) 6' 2\" (1.88 m) 164 lb (74.4 kg) 100% BMI Smoking Status 21.06 kg/m2 Never Smoker Vitals History BMI and BSA Data Body Mass Index Body Surface Area 21.06 kg/m 2 1.97 m 2 Preferred Pharmacy Pharmacy Name Phone [id number] [place] [phone] Your Updated Medication List  
  
   
This list is accurate as of 3/1/18 11:05 AM.  Always use your most recent med list.  
  
  
  
  
 triamcinolone acetonide 0.1 % topical cream  
Commonly known as:  KENALOG  
use thin layer We Performed the Following CHLAMYDIA/GC AMPLIFICATION [ITP02100 Custom] CULTURE, HSV W/ TYPING [09402 CPT(R)] HIV 1/2 AG/AB, 4TH GENERATION,W RFLX CONFIRM [id number] CPT(R)] HSV 1/2 AB, IGM T479296 CPT(R)] RPR [76096 CPT(R)] Follow-up Instructions Return if symptoms worsen or fail to improve. To-Do List   
 03/01/2018 Lab:  HERPES SIMPLEX VIRUS TYPES 1/2 SPECIFIC AB, IGG Introducing [facility] SERVICES! Dear José Antonio Adolfo: Thank you for requesting a Helium account. Our records indicate that you already have an active Helium account. You can access your account anytime at [email or web address] Did you know that you can access your hospital and ER discharge instructions at any time in Helium? You can also review all of your test results from your hospital stay or ER visit. Additional Information If you have questions, please visit the Frequently Asked Questions section of the Helium website at [email or web address] Remember, Helium is NOT to be used for urgent needs. For medical emergencies, dial 911. Now available from your iPhone and Android! Please provide this summary of care documentation to your next provider. Your primary care clinician is listed as Red Khan. If you have any questions after today's visit, please call [phone].

## 2018-03-02 LAB — SPECIMEN STATUS REPORT, ROLRST: NORMAL

## 2018-03-03 LAB
HIV 1+2 AB+HIV1 P24 AG SERPL QL IA: NON REACTIVE
HSV1+2 IGM SER IA-ACNC: 1.25 RATIO (ref 0–0.9)
RPR SER QL: NON REACTIVE

## 2018-03-03 NOTE — PROGRESS NOTES
Please check on the status of the HSV viral culture. [facility] also [facility] [address] [place] [address] [facility]  Did the lab not obtain the urine sample??

## 2018-03-06 LAB
HSV1 IGG SER IA-ACNC: 9.55 INDEX (ref 0–0.9)
HSV2 IGG SER IA-ACNC: 1.87 INDEX (ref 0–0.9)
SPECIMEN STATUS REPORT, ROLRST: NORMAL

## 2018-03-06 RX ORDER — VALACYCLOVIR HYDROCHLORIDE 500 MG/1
500 TABLET, FILM COATED ORAL 2 TIMES DAILY
Qty: 6 TAB | Refills: 1 | Status: SHIPPED | OUTPATIENT
Start: 2018-03-06 | End: 2018-03-09

## 2018-03-07 LAB
HSV SPEC CULT: ABNORMAL
SPECIMEN STATUS REPORT, ROLRST: NORMAL

## 2018-03-23 ENCOUNTER — TELEPHONE (OUTPATIENT)
Dept: INTERNAL MEDICINE CLINIC | Age: 58
End: 2018-03-23

## 2018-03-23 NOTE — TELEPHONE ENCOUNTER
Identified patient 2 identifiers verified.  Patient requesting that Dr. Teressa Braun call him symptoms not improving

## 2018-03-26 ENCOUNTER — OFFICE VISIT (OUTPATIENT)
Dept: INTERNAL MEDICINE CLINIC | Age: 58
End: 2018-03-26

## 2018-03-26 VITALS
TEMPERATURE: 97.6 F | SYSTOLIC BLOOD PRESSURE: 108 MMHG | HEIGHT: 74 IN | DIASTOLIC BLOOD PRESSURE: 71 MMHG | RESPIRATION RATE: 18 BRPM | OXYGEN SATURATION: 98 % | BODY MASS INDEX: 21.17 KG/M2 | HEART RATE: 70 BPM | WEIGHT: 165 LBS

## 2018-03-26 DIAGNOSIS — Z00.00 ANNUAL PHYSICAL EXAM: Primary | ICD-10-CM

## 2018-03-26 RX ORDER — TRIAMCINOLONE ACETONIDE 1 MG/G
CREAM TOPICAL
Qty: 15 G | Refills: 0 | Status: SHIPPED | OUTPATIENT
Start: 2018-03-26 | End: 2019-07-19 | Stop reason: SDUPTHER

## 2018-03-26 NOTE — MR AVS SNAPSHOT
[address] [facility] 
[phone] Patient: Rafy Meade MRN: [id number] Visit Information Date & Time Provider Department Dept. Phone Encounter #  
 3/26/2018  3:45 PM Smith Marquis, [address] [facility] [id number] Follow-up Instructions Return in about 1 year (around 3/26/2019) for CPE/fasting labs. Upcoming Health Maintenance Date Due DTaP/Tdap/Td series (1 - Tdap) 9/28/1983 Influenza Age 5 to Adult 8/1/2017 COLONOSCOPY 12/18/2017 Allergies as of 3/26/2018  Review Complete On: 3/26/2018 By: Gideon Giron LPN Severity Noted Reaction Type Reactions Pollen Extracts  05/26/2010    Runny Nose Current Immunizations  Reviewed on 6/23/2012 No immunizations on file. Not reviewed this visit You Were Diagnosed With   
  
 Codes Comments Annual physical exam    -  Primary ICD-10-CM: Z00.00 ICD-9-CM: V70.0 Vitals BP Pulse Temp Resp Height(growth percentile) Weight(growth percentile) 108/71 (BP 1 Location: Left arm, BP Patient Position: Sitting) 70 97.6 °F (36.4 °C) (Oral) 18 6' 2\" (1.88 m) 165 lb (74.8 kg) SpO2 BMI Smoking Status 98% 21.18 kg/m2 Never Smoker Vitals History BMI and BSA Data Body Mass Index Body Surface Area  
 21.18 kg/m 2 1.98 m 2 Preferred Pharmacy Pharmacy Name Phone [id number] [place] [id number] [facility] [phone] Your Updated Medication List  
  
   
This list is accurate as of 3/26/18  5:55 PM.  Always use your most recent med list.  
  
  
  
  
 triamcinolone acetonide 0.1 % topical cream  
Commonly known as:  KENALOG  
use thin layer Prescriptions Sent to Pharmacy Refills  
 triamcinolone acetonide (KENALOG) 0.1 % topical cream 0 Sig: use thin layer Class: Normal  
 [id number] [address]  #: [phone] We Performed the Following HEMOGLOBIN A1C WITH EAG [30850 CPT(R)] LIPID PANEL [96640 CPT(R)] METABOLIC PANEL, COMPREHENSIVE [04374 CPT(R)] PSA, DIAGNOSTIC (PROSTATE SPECIFIC AG) [id number] CPT(R)] REFERRAL TO CARDIOLOGY [OSN19 Custom] Comments: Hx of REFERRAL TO GASTROENTEROLOGY [ZCH74 Custom] T4, FREE [id number] CPT(R)] TSH 3RD GENERATION [66799 CPT(R)] Follow-up Instructions Return in about 1 year (around 3/26/2019) for CPE/fasting labs. Referral Information Referral ID Referred By Referred To  
  
 [id number] Clarice YOUNG 75   
   [address] Visits Status Start Date End Date 1 New Request 3/26/18 3/26/19 If your referral has a status of pending review or denied, additional information will be sent to support the outcome of this decision. Referral ID Referred By Referred To  
 [id number] KATIE YOUNG MD  
   [address] Phone: [phone] Fax: [phone] Visits Status Start Date End Date 1 New Request 3/26/18 3/26/19 If your referral has a status of pending review or denied, additional information will be sent to support the outcome of this decision. Introducing \[facility]\"" & HEALTH SERVICES! Dear Benito Nair: Thank you for requesting a MeetLinkshare account. Our records indicate that you already have an active MeetLinkshare account. You can access your account anytime at [email or web address] Did you know that you can access your hospital and ER discharge instructions at any time in MeetLinkshare? You can also review all of your test results from your hospital stay or ER visit. Additional Information If you have questions, please visit the Frequently Asked Questions section of the [email or web address] website at [email or web address] Remember, [employer] is NOT to be used for urgent needs. For medical emergencies, dial 911. Now available from your iPhone and Android! Please provide this summary of care documentation to your next provider. Your primary care clinician is listed as Jossy Martino. If you have any questions after today's visit, please call [phone].

## 2018-03-27 ENCOUNTER — APPOINTMENT (OUTPATIENT)
Dept: INTERNAL MEDICINE CLINIC | Age: 58
End: 2018-03-27

## 2018-03-28 ENCOUNTER — TELEPHONE (OUTPATIENT)
Dept: INTERNAL MEDICINE CLINIC | Age: 58
End: 2018-03-28

## 2018-03-28 LAB
ALBUMIN SERPL-MCNC: 4.5 G/DL (ref 3.5–5.5)
ALBUMIN/GLOB SERPL: 1.7 {RATIO} (ref 1.2–2.2)
ALP SERPL-CCNC: 42 IU/L (ref 39–117)
ALT SERPL-CCNC: 30 IU/L (ref 0–44)
AST SERPL-CCNC: 22 IU/L (ref 0–40)
BILIRUB SERPL-MCNC: 0.5 MG/DL (ref 0–1.2)
BUN SERPL-MCNC: 18 MG/DL (ref 6–24)
BUN/CREAT SERPL: 18 (ref 9–20)
CALCIUM SERPL-MCNC: 9.6 MG/DL (ref 8.7–10.2)
CHLORIDE SERPL-SCNC: 100 MMOL/L (ref 96–106)
CHOLEST SERPL-MCNC: 251 MG/DL (ref 100–199)
CO2 SERPL-SCNC: 25 MMOL/L (ref 18–29)
CREAT SERPL-MCNC: 0.98 MG/DL (ref 0.76–1.27)
EST. AVERAGE GLUCOSE BLD GHB EST-MCNC: 100 MG/DL
GFR SERPLBLD CREATININE-BSD FMLA CKD-EPI: 100 ML/MIN/1.73
GFR SERPLBLD CREATININE-BSD FMLA CKD-EPI: 86 ML/MIN/1.73
GLOBULIN SER CALC-MCNC: 2.6 G/DL (ref 1.5–4.5)
GLUCOSE SERPL-MCNC: 88 MG/DL (ref 65–99)
HBA1C MFR BLD: 5.1 % (ref 4.8–5.6)
HDLC SERPL-MCNC: 74 MG/DL
LDLC SERPL CALC-MCNC: 164 MG/DL (ref 0–99)
POTASSIUM SERPL-SCNC: 5.2 MMOL/L (ref 3.5–5.2)
PROT SERPL-MCNC: 7.1 G/DL (ref 6–8.5)
PSA SERPL-MCNC: 2 NG/ML (ref 0–4)
SODIUM SERPL-SCNC: 141 MMOL/L (ref 134–144)
T4 FREE SERPL-MCNC: 1.31 NG/DL (ref 0.82–1.77)
TRIGL SERPL-MCNC: 67 MG/DL (ref 0–149)
TSH SERPL DL<=0.005 MIU/L-ACNC: 2.01 UIU/ML (ref 0.45–4.5)
VLDLC SERPL CALC-MCNC: 13 MG/DL (ref 5–40)

## 2018-03-28 NOTE — PROGRESS NOTES
SUBJECTIVE:   Mr. Kaiser Santiago is a 56 y.o. male who is here for follow up of routine medical issues. Pt was concerned for lesions on his penis. He noted there were two lesions, that he felt looked like red ulcers-like sores. He reported in July 2017, he had a abrasion to the area from his pants' zipper. He wondered if the lesions are related to that occurrence. Pt recalls in his 19's he had a similar lesion on his finger and mentions his mother frequently had cold sores. Pt previously saw Dr. Joseph Solares regarding this concern. Dr. 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 He was given a prescription for Valtrex. Pt reports he never followed up with a cardiologist.     At this time, he is otherwise doing well and has brought no other complaints to my attention today. For a list of the medical issues addressed today, see the assessment and plan below. PMH:   Past Medical History:   Diagnosis Date    Calculus of kidney     [facility] failure ([facility]     Hyperlipidemia 6/24/2010    Hyperlipidemia 6/24/2010    Migraine 6/1/2011    Nephrolithiasis 5/26/2010    Other ill-defined conditions(799.89)     etoh abuse    [facility]ox[facility]     Proteinuria 5/26/2010    Psychiatric disorder     depression     PSH:  has a past surgical history that includes pr cardiac surg procedure unlist and hx orthopaedic. All: is allergic to pollen extracts. MEDS:   Current Outpatient Prescriptions   Medication Sig    triamcinolone acetonide (KENALOG) 0.1 % topical cream use thin layer     No current facility-administered medications for this visit. FH: family history includes Bleeding Prob in his mother; Heart Attack in his father; Heart Disease (age of onset: 62) in his father; Parkinsonism in his father. SH:  reports that he has never smoked.  He has never used smokeless tobacco. He reports that he drinks about 8.4 oz of alcohol per week  He reports that he does not use illicit drugs. Review of Systems - History obtained from the patient  General ROS: no fever, chills, fatigue, body aches  Psychological ROS: no change in anxiety, depression, SI/HI  Ophthalmic ROS: no blurred vision, myopia, double vision  ENT ROS: no dysphagia, otalgia, otorrhea, rhinorrhea, post nasal drip  Respiratory ROS: no cough, shortness of breath, or wheezing  Cardiovascular ROS: no chest pain or dyspnea on exertion  Gastrointestinal ROS: no abdominal pain, change in bowel habits, or black or bloody stools  Genito-Urinary ROS: no frequency, urgency, incontinence, dysuria, hematouria  Musculoskeletal ROS: no arthralagia, myalgia  Neurological ROS: no headaches, dizziness, lightheadedness, tremors, seizures  Dermatological ROS: no rash or lesions    OBJECTIVE:   Vitals:   Visit Vitals    /71 (BP 1 Location: Left arm, BP Patient Position: Sitting)    Pulse 70    Temp 97.6 °F (36.4 °C) (Oral)    Resp 18    Ht 6' 2\" (1.88 m)    Wt 165 lb (74.8 kg)    SpO2 98%    BMI 21.18 kg/m2      Gen: Pleasant 56 y.o.  male in NAD. HEENT: PERRLA. EOMI. OP moist and pink. Neck: Supple. No LAD. HEART: RRR, No M/G/R.      LUNGS: CTAB No W/R. ABDOMEN: S, NT, ND, BS+. EXTREMITIES: Warm. No C/C/E.    MUSCULOSKELETAL: Normal ROM, muscle strength 5/5 all groups. NEURO: Alert and oriented x 3. Cranial nerves grossly intact. No focal sensory or motor deficits noted. SKIN: Warm. Dry. No rashes or other lesions noted. Male : No significant finds. ASSESSMENT/ PLAN: Diagnoses and all orders for this visit:    1.  Annual physical exam  -     LIPID PANEL  -     HEMOGLOBIN A1C WITH EAG  -     METABOLIC PANEL, COMPREHENSIVE  -     PROSTATE SPECIFIC AG  -     TSH 3RD GENERATION  -     T4, FREE  -     [facility]  -     triamcinolone acetonide (KENALOG) 0.1 % topical cream; use thin layer  -     REFERRAL TO CARDIOLOGY        ICD-10-CM ICD-9-CM 1. Annual physical exam Z00.00 V70.0 LIPID PANEL      HEMOGLOBIN A1C WITH EAG      METABOLIC PANEL, COMPREHENSIVE      PSA, DIAGNOSTIC (PROSTATE SPECIFIC AG)      TSH 3RD GENERATION      T4, FREE      REFERRAL TO GASTROENTEROLOGY      triamcinolone acetonide (KENALOG) 0.1 % topical cream      REFERRAL TO CARDIOLOGY      1. Annual Physical Exam  Pt was given the following labs orders: lipid panel, HgA1c, CMP, PSA, TSH, and T4. Pt will return for labs when he is fasting. Pt is due for a colonoscopy (referral to GI given). Pt was counseled about ways HSV is transmitted. We discussed the possibility of doing a maintenance dose of Valtrex if he has recurrent flare ups in the coming year. I refilled pt's kenalog prescription. I advised pt to follow up with his cardiologist (referral given). I advised pt to follow up in a year. Follow-up Disposition:  Return in about 1 year (around 3/26/2019) for CPE/fasting labs. I have reviewed the patient's medications and risks/side effects/benefits were discussed. Diagnosis(-es) explained to patient and questions answered. Literature provided where appropriate.      Written by Sedrick Skinner, as dictated by Lawanda Lopez MD.

## 2018-03-28 NOTE — TELEPHONE ENCOUNTER
Identified patient 2 identifiers verified. Valtrex prescription went through per [facility] patient made aware.

## 2018-03-28 NOTE — TELEPHONE ENCOUNTER
Patient states he needs a call back in reference to taking prescription to the pharmacy for what patient states is generic Valtrex & they would not fill. Please call to discuss.  Thank you

## 2018-04-06 ENCOUNTER — TELEPHONE (OUTPATIENT)
Dept: INTERNAL MEDICINE CLINIC | Age: 58
End: 2018-04-06

## 2018-05-18 ENCOUNTER — TELEPHONE (OUTPATIENT)
Dept: CARDIOLOGY CLINIC | Age: 58
End: 2018-05-18

## 2018-05-18 ENCOUNTER — OFFICE VISIT (OUTPATIENT)
Dept: CARDIOLOGY CLINIC | Age: 58
End: 2018-05-18

## 2018-05-18 VITALS
BODY MASS INDEX: 21.3 KG/M2 | HEIGHT: 74 IN | HEART RATE: 76 BPM | SYSTOLIC BLOOD PRESSURE: 100 MMHG | OXYGEN SATURATION: 100 % | RESPIRATION RATE: 18 BRPM | WEIGHT: 166 LBS | DIASTOLIC BLOOD PRESSURE: 70 MMHG

## 2018-05-18 DIAGNOSIS — I48.0 PAF (PAROXYSMAL ATRIAL FIBRILLATION) (HCC): Primary | ICD-10-CM

## 2018-05-18 DIAGNOSIS — G46.4 CEREBELLAR STROKE SYNDROME: ICD-10-CM

## 2018-05-18 DIAGNOSIS — Q25.1 AORTA COARCTATION: ICD-10-CM

## 2018-05-18 DIAGNOSIS — E78.2 MIXED HYPERLIPIDEMIA: ICD-10-CM

## 2018-05-18 RX ORDER — ASPIRIN 81 MG/1
81 TABLET ORAL DAILY
COMMUNITY
Start: 2018-05-18

## 2018-05-18 RX ORDER — METOPROLOL SUCCINATE 25 MG/1
12.5 TABLET, EXTENDED RELEASE ORAL
Qty: 15 TAB | Refills: 3 | Status: SHIPPED | OUTPATIENT
Start: 2018-05-18 | End: 2018-10-24

## 2018-05-18 RX ORDER — AA/PROT/LYSINE/METHIO/VIT C/B6 50-12.5 MG
TABLET ORAL DAILY
COMMUNITY

## 2018-05-18 NOTE — MR AVS SNAPSHOT
Nelly Cabrera [facility] 
[phone] Patient: Jayleen Ibarra MRN: [id number] VUN:7/92/1864 Visit Information Date & Time Provider Department Dept. Phone Encounter #  
 5/18/2018 10:30 AM Steve Rutledge, [address] [place] [address] [facility] [phone] [id number] Your Appointments 5/23/2018  8:00 AM  
ECHO CARDIOGRAMS 2D with 6 [facility] [address] [place] [address]) Appt Note: Per  , 2D ECHO COMPLETE ADULT (TTE) W OR WO CONTR [[id number]] (Order [id number])- [address]  
[phone] [address] [place] [address] [address] [id number]  
  
    
 5/29/2018  2:30 PM  
STRESS TEST with STRESSECHO, [facility] [address] [place] [address]) Appt Note: Per , STRESS TEST CARDIAC [[id number]] (Order [id number])- [address]  
[phone] [address] [place] [address] Upcoming Health Maintenance Date Due DTaP/Tdap/Td series (1 - Tdap) 9/28/1983 COLONOSCOPY 12/18/2017 Influenza Age 5 to Adult 8/1/2018 Allergies as of 5/18/2018  Review Complete On: 5/18/2018 By: Steve Rutledge MD  
  
 Severity Noted Reaction Type Reactions Pollen Extracts  05/26/2010    Runny Nose Current Immunizations  Reviewed on 6/23/2012 No immunizations on file. Not reviewed this visit You Were Diagnosed With   
  
 Codes Comments [facility]    -  Primary ICD-10-CM: I48.0 ICD-9-CM: 427.31 Cerebellar stroke syndrome     ICD-10-CM: G46.4 ICD-9-CM: 095 Aorta coarctation     ICD-10-CM: Q25.1 ICD-9-CM: 747.10 Mixed hyperlipidemia     ICD-10-CM: E78.2 ICD-9-CM: 272.2 Vitals BP Pulse Resp Height(growth percentile) Weight(growth percentile) SpO2 100/70 (BP 1 Location: Right arm, BP Patient Position: Sitting) 76 18 6' 2\" (1.88 m) 166 lb (75.3 kg) 100% BMI Smoking Status 21.31 kg/m2 Never Smoker Vitals History BMI and BSA Data Body Mass Index Body Surface Area  
 21.31 kg/m 2 1.98 m 2 Preferred Pharmacy Pharmacy Name Phone [id number] [phone] Your Updated Medication List  
  
   
This list is accurate as of 5/18/18 11:58 AM.  Always use your most recent med list.  
  
  
  
  
 aspirin delayed-release 81 mg tablet Take 1 Tab by mouth daily. coenzyme q10 10 mg Cap Take  by mouth daily. FISH -160-1,000 mg Cap Generic drug:  omega 3-dha-epa-fish oil Take  by mouth two (2) times a day. metoprolol succinate 25 mg XL tablet Commonly known as:  TOPROL-XL Take 0.5 Tabs by mouth nightly. triamcinolone acetonide 0.1 % topical cream  
Commonly known as:  KENALOG  
use thin layer Prescriptions Sent to Pharmacy Refills  
 metoprolol succinate (TOPROL-XL) 25 mg XL tablet 3 Sig: Take 0.5 Tabs by mouth nightly. Class: Normal  
 Pharmacy: [facility]/pharmacy #[id number]- [id number] Ph #: [phone] Route: Oral  
  
We Performed the Following 2D ECHO COMPLETE ADULT (TTE) W OR WO CONTR [35587 CPT(R)] AMB POC EKG ROUTINE W/ 12 LEADS, INTER & REP [52929 CPT(R)] LIPID PANEL [42434 CPT(R)] METABOLIC PANEL, COMPREHENSIVE [77039 CPT(R)] To-Do List   
 05/21/2018 ECG:  STRESS TEST CARDIAC Introducing [facility]! Dear Hope : Thank you for requesting a [facility] account. Our records indicate that you already have an active [facility] account. You can access your account anytime at [email or web address] Did you know that you can access your hospital and ER discharge instructions at any time in [employer]? You can also review all of your test results from your hospital stay or ER visit. Additional Information If you have questions, please visit the Frequently Asked Questions section of the [employer] website at [email or web address] Remember, [employer] is NOT to be used for urgent needs. For medical emergencies, dial 911. Now available from your iPhone and Android! Please provide this summary of care documentation to your next provider. Your primary care clinician is listed as Bob Garibay. If you have any questions after today's visit, please call [phone].

## 2018-05-18 NOTE — PROGRESS NOTES
Chief Complaint   Patient presents with    New Patient     referred by PCP, C/O a few episodes of chest tightness during the night   1. Have you been to the ER, urgent care clinic since your last visit? Hospitalized since your last visit? No    2. Have you seen or consulted any other health care providers outside of the [address] [place] [address] since your last visit? Include any pap smears or colon screening.  N/A

## 2018-05-18 NOTE — PROGRESS NOTES
Kemi Zazueta DNP, ANP-BC  Subjective/HPI:     Jayleen Ibarra is a 56 y.o. male is here for new patient consultation previously seen in the practice by Dr. Jeremy Wolf. Patient has a history of paroxysmal atrial fibrillation initially diagnosed in June 2012 when seen at [address] [place] [address]. He then subsequently was admitted for cerebellar stroke syndrome, transesophageal cardiogram did not show PFO or VSD. Valvular structures were normal, no vegetative growth noted. In 2015 he was seen by Dr. Jeremy Wolf, at the time he was on Multaq and pradaxa, after a normal findings on event monitor the medications were discontinued. Patient reports last year in June he did notice he was having episodes of intermittent fluttering and palpitations, he felt that it may have been secondary to feeling nervous. Earlier this year patient experienced 2 episodes of anterior chest discomfort described as burning with associated belching, it awoke him from sleep and both events were triggered after late-night eating. Regarding his hyperlipidemia: He has adopted a extremely low saturated fat diet, increasing some activities has stopped eating out and no consumption of fast food.     PCP Provider  Sheila Wayne MD  Past Medical History:   Diagnosis Date    Calculus of kidney     [facility]     Hyperlipidemia 6/24/2010    Hyperlipidemia 6/24/2010    Migraine 6/1/2011    Nephrolithiasis 5/26/2010    Other ill-defined conditions(799.89)     etoh abuse    PAF (paroxysmal atrial fibrillation) ([facility]     Proteinuria 5/26/2010    Psychiatric disorder     depression      Past Surgical History:   Procedure Laterality Date    CARDIAC SURG PROCEDURE UNLIST      sx for coarctaion of aorta    HX ORTHOPAEDIC      shoulder sx     Allergies   Allergen Reactions    Pollen Extracts Runny Nose      Family History   Problem Relation Age of Onset    Bleeding Prob Mother     Parkinsonism Father     Heart Disease Father 62     MI x two  Heart Attack Father       Current Outpatient Prescriptions   Medication Sig    omega 3-dha-epa-fish oil (FISH OIL) 100-160-1,000 mg cap Take  by mouth two (2) times a day.  coenzyme q10 10 mg cap Take  by mouth daily.  metoprolol succinate (TOPROL-XL) 25 mg XL tablet Take 0.5 Tabs by mouth nightly.  aspirin delayed-release 81 mg tablet Take 1 Tab by mouth daily.  triamcinolone acetonide (KENALOG) 0.1 % topical cream use thin layer (Patient taking differently: four (4) times daily as needed. use thin layer)     No current facility-administered medications for this visit. Vitals:    05/18/18 1041 05/18/18 1055   BP: 104/70 100/70   Pulse: 76    Resp: 18    SpO2: 100%    Weight: 166 lb (75.3 kg)    Height: 6' 2\" (1.88 m)      Social History     Social History    Marital status:      Spouse name: N/A    Number of children: N/A    Years of education: N/A     Occupational History    Not on file. Social History Main Topics    Smoking status: Never Smoker    Smokeless tobacco: Never Used    Alcohol use 8.4 oz/week     14 Cans of beer per week    Drug use: No    Sexual activity: Yes     Partners: Female     Birth control/ protection: Pill     Other Topics Concern    Not on file     Social History Narrative       I have reviewed the nurses notes, vitals, problem list, allergy list, medical history, family, social history and medications. Review of Symptoms:    General: Pt denies excessive weight gain or loss. Pt is able to conduct ADL's  HEENT: Denies blurred vision, headaches, epistaxis and difficulty swallowing. Respiratory: Denies shortness of breath, MARTIN, wheezing or stridor.   Cardiovascular: Denies precordial pain, palpitations, edema or PND  Gastrointestinal: Denies poor appetite, indigestion, abdominal pain or blood in stool  Musculoskeletal: Denies pain or swelling from muscles or joints  Neurologic: Denies tremor, paresthesias, or sensory motor disturbance  Skin: Denies rash, itching or texture change. Physical Exam:      General: Well developed, in no acute distress, cooperative and alert  HEENT: No carotid bruits, no JVD, trach is midline. Neck Supple, PEERL, EOM intact. Heart:  Normal S1/S2 negative S3 or S4. Regular, no murmur, gallop or rub.   Respiratory: Clear bilaterally x 4, no wheezing or rales  Abdomen:   Soft, non-tender, no masses, bowel sounds are active.   Extremities:  No edema, normal cap refill, no cyanosis, atraumatic. Neuro: A&Ox3, speech clear, gait stable. Skin: Skin color is normal. No rashes or lesions.  Non diaphoretic  Vascular: 2+ pulses symmetric in all extremities    Cardiographics    ECG: Normal sinus rhythm  Results for orders placed or performed during the hospital encounter of 06/12/17   EKG, 12 LEAD, INITIAL   Result Value Ref Range    Ventricular Rate 67 BPM    Atrial Rate 67 BPM    P-R Interval 138 ms    QRS Duration 114 ms    Q-T Interval 410 ms    QTC Calculation (Bezet) 433 ms    Calculated P Axis 80 degrees    Calculated R Axis 98 degrees    Calculated T Axis 44 degrees    Diagnosis       Normal sinus rhythm  Possible Left atrial enlargement  Rightward axis  Incomplete right bundle branch block  When compared with ECG of 23-JUN-2012 06:41,  No significant change was found  Confirmed by Shawna Mclain ([id number]) on 6/12/2017 4:35:56 PM           Cardiology Labs:  Lab Results   Component Value Date/Time    Cholesterol, total 251 (H) 03/27/2018 09:21 AM    HDL Cholesterol 74 03/27/2018 09:21 AM    LDL, calculated 164 (H) 03/27/2018 09:21 AM    Triglyceride 67 03/27/2018 09:21 AM    CHOL/HDL Ratio 3.1 06/21/2012 04:09 AM       Lab Results   Component Value Date/Time    Sodium 141 03/27/2018 09:21 AM    Potassium 5.2 03/27/2018 09:21 AM    Chloride 100 03/27/2018 09:21 AM    CO2 25 03/27/2018 09:21 AM    Anion gap 6 06/12/2017 10:57 AM    Glucose 88 03/27/2018 09:21 AM    BUN 18 03/27/2018 09:21 AM    Creatinine 0.98 03/27/2018 09:21 AM BUN/Creatinine ratio 18 03/27/2018 09:21 AM    GFR est  03/27/2018 09:21 AM    GFR est non-AA 86 03/27/2018 09:21 AM    Calcium 9.6 03/27/2018 09:21 AM    Bilirubin, total 0.5 03/27/2018 09:21 AM    AST (SGOT) 22 03/27/2018 09:21 AM    Alk. phosphatase 42 03/27/2018 09:21 AM    Protein, total 7.1 03/27/2018 09:21 AM    Albumin 4.5 03/27/2018 09:21 AM    Globulin 3.7 06/12/2017 10:57 AM    A-G Ratio 1.7 03/27/2018 09:21 AM    ALT (SGPT) 30 03/27/2018 09:21 AM           Assessment:     Assessment:     Diagnoses and all orders for this visit:    1. PAF (paroxysmal atrial fibrillation) ([facility])  -     AMB POC EKG ROUTINE W/ 12 LEADS, INTER & REP  -     2D ECHO COMPLETE ADULT (TTE) W OR WO CONTR  -     METABOLIC PANEL, COMPREHENSIVE  -     LIPID PANEL  -     STRESS TEST CARDIAC; Future    2. Cerebellar stroke syndrome  -     2D ECHO COMPLETE ADULT (TTE) W OR WO CONTR  -     METABOLIC PANEL, COMPREHENSIVE  -     LIPID PANEL  -     STRESS TEST CARDIAC; Future    3. Aorta coarctation repair age 8  -     2D ECHO COMPLETE ADULT (TTE) W OR WO CONTR  -     METABOLIC PANEL, COMPREHENSIVE  -     LIPID PANEL  -     STRESS TEST CARDIAC; Future    4. Mixed hyperlipidemia  -     2D ECHO COMPLETE ADULT (TTE) W OR WO CONTR  -     METABOLIC PANEL, COMPREHENSIVE  -     LIPID PANEL  -     STRESS TEST CARDIAC; Future    Other orders  -     metoprolol succinate (TOPROL-XL) 25 mg XL tablet; Take 0.5 Tabs by mouth nightly. ICD-10-CM ICD-9-CM    1. PAF (paroxysmal atrial fibrillation) (H[facility]) I48.0 427.31 AMB POC EKG ROUTINE W/ 12 LEADS, INTER & REP      2D ECHO COMPLETE ADULT (TTE) W OR WO CONTR      METABOLIC PANEL, COMPREHENSIVE      LIPID PANEL      STRESS TEST CARDIAC   2. Cerebellar stroke syndrome G46.4 436 2D ECHO COMPLETE ADULT (TTE) W OR WO CONTR      METABOLIC PANEL, COMPREHENSIVE      LIPID PANEL      STRESS TEST CARDIAC   3.  Aorta coarctation repair age 8 Q21.3 1.7 2D ECHO COMPLETE ADULT (TTE) W OR WO CONTR METABOLIC PANEL, COMPREHENSIVE      LIPID PANEL      STRESS TEST CARDIAC   4. Mixed hyperlipidemia E78.2 272.2 2D ECHO COMPLETE ADULT (TTE) W OR WO CONTR      METABOLIC PANEL, COMPREHENSIVE      LIPID PANEL      STRESS TEST CARDIAC     Orders Placed This Encounter    METABOLIC PANEL, COMPREHENSIVE    LIPID PANEL    AMB POC EKG ROUTINE W/ 12 LEADS, INTER & REP     Order Specific Question:   Reason for Exam:     Answer:   routine    STRESS TEST CARDIAC     Standing Status:   Future     Standing Expiration Date:   11/18/2018     Order Specific Question:   Reason for Exam:     Answer:   PAF    2D ECHO COMPLETE ADULT (TTE) W OR WO CONTR     Order Specific Question:   Reason for Exam:     Answer:   hx of aotric coart. repair     Order Specific Question:   Contrast Enhancement (Bubble Study, Definity, Optison) may be used if criteria listed in established evidence-based protocol has been identified. Answer: Yes    omega 3-dha-epa-fish oil (FISH OIL) 100-160-1,000 mg cap     Sig: Take  by mouth two (2) times a day.  coenzyme q10 10 mg cap     Sig: Take  by mouth daily.  metoprolol succinate (TOPROL-XL) 25 mg XL tablet     Sig: Take 0.5 Tabs by mouth nightly. Dispense:  15 Tab     Refill:  3    aspirin delayed-release 81 mg tablet     Sig: Take 1 Tab by mouth daily. Plan:     Patient is a 82-year-old male with a history of CVA diagnosed in 2012 and paroxysmal atrial fibrillation. Discussed anticoagulation with Eliquis given elevated Piexk7Nik score of 2 (CVA), he prefers to do some research first and then will contact us with his decision. He says he will likely call back today and ask for my nurse Eliza after talking to his sister who is a pharmacist.  Agrees to start metoprolol XL 12.5 mg nightly and enteric-coated aspirin 81 mg daily. Discussed implantable loop recorder if opting not to be anticoagulated initially for surveillance for \ recurrent atrial fibrillation.   Will proceed with echocardiogram routine exercise stress test.      If stress test normal, coronary calcium scoring would be reassuring if totally normal and threshold for further testing/treatment would be decreased if high- the patient wishes to proceed (he will call [phone] to schedule) and will call for my input on results after testing is completed. Follow-up to be determined based on test results. Janet Vu MD    This note was created using voice recognition software. Despite editing, there may be syntax errors. Addendum 5/18/2018 at 12:44 PM:  The patient has discussed with his sister who is a pharmacist.  He is in agreement with taking Eliquis. Prescribed. Our office will call him to tell him that discount card is available.

## 2018-05-18 NOTE — TELEPHONE ENCOUNTER
Patient agree to start Stephanie Fuller to send 30 day supply to local pharmacy Co-pay and 30 day free discount cards placed at .

## 2018-05-21 RX ORDER — VALACYCLOVIR HYDROCHLORIDE 500 MG/1
500 TABLET, FILM COATED ORAL 2 TIMES DAILY
Qty: 60 TAB | Refills: 6 | Status: SHIPPED | OUTPATIENT
Start: 2018-05-21 | End: 2018-12-12 | Stop reason: SDUPTHER

## 2018-05-22 ENCOUNTER — TELEPHONE (OUTPATIENT)
Dept: CARDIOLOGY CLINIC | Age: 58
End: 2018-05-22

## 2018-05-22 NOTE — TELEPHONE ENCOUNTER
Pt called with questions regarding last weeks visit and the medications. Please call pt on tel [phone] to discuss.     advsd could take up to 24 hrs for call  Back    Shannon Bolanos

## 2018-05-23 ENCOUNTER — TELEPHONE (OUTPATIENT)
Dept: CARDIOLOGY CLINIC | Age: 58
End: 2018-05-23

## 2018-05-23 ENCOUNTER — CLINICAL SUPPORT (OUTPATIENT)
Dept: CARDIOLOGY CLINIC | Age: 58
End: 2018-05-23

## 2018-05-23 DIAGNOSIS — I48.0 PAF (PAROXYSMAL ATRIAL FIBRILLATION) (HCC): ICD-10-CM

## 2018-05-23 DIAGNOSIS — Q25.1 AORTA COARCTATION: Primary | ICD-10-CM

## 2018-05-23 DIAGNOSIS — E78.2 MIXED HYPERLIPIDEMIA: ICD-10-CM

## 2018-05-23 NOTE — TELEPHONE ENCOUNTER
Patient has some concerns metoprolol and eliquis medication. Please call when available to discuss.     Thanks,    [employer]

## 2018-05-23 NOTE — TELEPHONE ENCOUNTER
Patient was notified by pharmacy that the Eliquis may be $ 400.00 mtly even with the Co-Pay card due to his high deductible will try to activate card and let us know if he can afford this medication.

## 2018-05-29 ENCOUNTER — CLINICAL SUPPORT (OUTPATIENT)
Dept: CARDIOLOGY CLINIC | Age: 58
End: 2018-05-29

## 2018-05-29 DIAGNOSIS — G46.4 CEREBELLAR STROKE SYNDROME: ICD-10-CM

## 2018-05-29 DIAGNOSIS — Q25.1 AORTA COARCTATION: ICD-10-CM

## 2018-05-29 DIAGNOSIS — I48.0 PAF (PAROXYSMAL ATRIAL FIBRILLATION) (HCC): ICD-10-CM

## 2018-05-29 DIAGNOSIS — E78.2 MIXED HYPERLIPIDEMIA: ICD-10-CM

## 2018-06-04 ENCOUNTER — HOSPITAL ENCOUNTER (OUTPATIENT)
Dept: CT IMAGING | Age: 58
Discharge: HOME | End: 2018-06-04
Payer: SELF-PAY

## 2018-06-04 DIAGNOSIS — Z00.00 PREVENTATIVE HEALTH CARE: ICD-10-CM

## 2018-06-04 PROCEDURE — 75571 CT HRT W/O DYE W/CA TEST: CPT

## 2018-06-06 NOTE — TELEPHONE ENCOUNTER
----- Message from Janet Vu MD sent at 5/31/2018  5:11 PM EDT -----  Echo is normal, although I am not sure close attention was necessarily paid to the aorta where he had coarctation in the past.      Stress test was normal, making it unlikely that he has any type blockages in the arteries of his heart. Coronary calcium scoring would be reassuring if totally normal and threshold for further testing/treatment would be decreased if high. If the patient wishes to proceed, call [address] [place] [address] to schedule and call for my input on results after testing is completed.

## 2018-06-20 ENCOUNTER — OFFICE VISIT (OUTPATIENT)
Dept: CARDIOLOGY CLINIC | Age: 58
End: 2018-06-20

## 2018-06-20 VITALS
DIASTOLIC BLOOD PRESSURE: 72 MMHG | BODY MASS INDEX: 21.46 KG/M2 | HEIGHT: 74 IN | WEIGHT: 167.2 LBS | RESPIRATION RATE: 16 BRPM | HEART RATE: 71 BPM | SYSTOLIC BLOOD PRESSURE: 124 MMHG | OXYGEN SATURATION: 98 %

## 2018-06-20 DIAGNOSIS — I48.0 PAF (PAROXYSMAL ATRIAL FIBRILLATION) (HCC): Primary | ICD-10-CM

## 2018-06-20 DIAGNOSIS — Q25.1 AORTA COARCTATION: ICD-10-CM

## 2018-06-20 DIAGNOSIS — E78.2 MIXED HYPERLIPIDEMIA: ICD-10-CM

## 2018-06-20 NOTE — MR AVS SNAPSHOT
Nelly Cabrera [address] [facility] 
[phone] Patient: Lorrie Pruitt MRN: [id number] Visit Information Date & Time Provider Department Dept. Phone Encounter #  
 6/20/2018  1:45 PM Catherine Mccall, [address] [place] [address] [facility] [phone] [id number] Your Appointments 6/26/2018 10:30 AM  
New Patient with Cheryn Jeans, MD  
[facility] [address]) Appt Note: ref by Dr. Kamara Numbers [facility]  
[phone] [address] [facility]  
  
    
 12/19/2018  3:15 PM  
ESTABLISHED PATIENT with Catherine Mccall MD  
[facility] [address]) Appt Note: 6 month f/u  
 [address] [facility]  
[phone] [address] [facility] Upcoming Health Maintenance Date Due DTaP/Tdap/Td series (1 - Tdap) 9/28/1983 COLONOSCOPY 12/18/2017 Influenza Age 5 to Adult 8/1/2018 Allergies as of 6/20/2018  Review Complete On: 6/20/2018 By: Catherine Mccall MD  
  
 Severity Noted Reaction Type Reactions Pollen Extracts  05/26/2010    Runny Nose Current Immunizations  Reviewed on 6/23/2012 No immunizations on file. Not reviewed this visit You Were Diagnosed With   
  
 Codes Comments PAF [facility]paroxysmal [facility] fibrillation) ([facility]    -  Primary ICD-10-CM: I48.0 ICD-9-CM: 427.31 Mixed hyperlipidemia     ICD-10-CM: E78.2 ICD-9-CM: 272.2 Aorta coarctation     ICD-10-CM: Q25.1 ICD-9-CM: 747.10 Vitals BP Pulse Resp Height(growth percentile) Weight(growth percentile) SpO2  
 124/72 (BP 1 Location: Right arm, BP Patient Position: Sitting) 71 16 6' 2\" (1.88 m) 167 lb 3.2 oz (75.8 kg) 98% BMI Smoking Status 21.47 kg/m2 Never Smoker Vitals History BMI and BSA Data Body Mass Index Body Surface Area  
 21.47 kg/m 2 1.99 m 2 Preferred Pharmacy Pharmacy Name Phone [id number], [id number] [phone] Your Updated Medication List  
  
   
This list is accurate as of 6/20/18  3:10 PM.  Always use your most recent med list.  
  
  
  
  
 apixaban 5 mg tablet Commonly known as:  Lenord Car Take 1 Tab by mouth every twelve (12) hours. aspirin delayed-release 81 mg tablet Take 1 Tab by mouth daily. coenzyme q10 10 mg Cap Take  by mouth daily. FISH -160-1,000 mg Cap Generic drug:  omega 3-dha-epa-fish oil Take  by mouth two (2) times a day. metoprolol succinate 25 mg XL tablet Commonly known as:  TOPROL-XL Take 0.5 Tabs by mouth nightly. triamcinolone acetonide 0.1 % topical cream  
Commonly known as:  KENALOG  
use thin layer  
  
 valACYclovir 500 mg tablet Commonly known as:  VALTREX Take 1 Tab by mouth two (2) times a day. Introducing [facility]! Dear Kathia Ramos: Thank you for requesting a [email or web address] account. Our records indicate that you already have an active [email or web address] account. You can access your account anytime at [email or web address] Did you know that you can access your hospital and ER discharge instructions at any time in [email or web address]? You can also review all of your test results from your hospital stay or ER visit. Additional Information If you have questions, please visit the Frequently Asked Questions section of the [email or web address] website at [email or web address] Remember, [email or web address] is NOT to be used for urgent needs. For medical emergencies, dial 911. Now available from your iPhone and Android! Please provide this summary of care documentation to your next provider.  
  
  
 Your primary care clinician is listed as Lizabeth Snow. If you have any questions after today's visit, please call [phone].

## 2018-06-20 NOTE — PROGRESS NOTES
Chief Complaint   Patient presents with    Results     f/u for test results      1. Have you been to the ER, urgent care clinic since your last visit? Hospitalized since your last visit? No    2. Have you seen or consulted any other health care providers outside of the [facility] since your last visit? Include any pap smears or colon screening.  No

## 2018-06-20 NOTE — PROGRESS NOTES
[address] [address]  [phone]     Subjective:      Melchor Mosley is a 56 y.o. male is here for routine f/u. The patient denies chest pain/ shortness of breath, orthopnea, PND, LE edema, palpitations, syncope, or presyncope. 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    Dizziness 06/19/2012    Persistent vomiting 06/19/2012    Aorta coarctation repair age 8 06/30/2011    Migraine 06/01/2011    Injury of right foot 03/31/2011    Acute bronchitis 01/28/2011    Hyperlipidemia 06/24/2010    Nephrolithiasis 05/26/2010      Niels Allred MD  Past Medical History:   Diagnosis Date    Calculus of kidney     [facility] failure ([facility]     Hyperlipidemia 6/24/2010    Hyperlipidemia 6/24/2010    Migraine 6/1/2011    Nephrolithiasis 5/26/2010    Other ill-defined conditions(799.89)     etoh abuse    PAF (paroxysmal atrial fibrillation) ([facility]     Proteinuria 5/26/2010    Psychiatric disorder     depression      Past Surgical History:   Procedure Laterality Date    CARDIAC SURG PROCEDURE UNLIST      sx for coarctaion of aorta    HX ORTHOPAEDIC      shoulder sx     Allergies   Allergen Reactions    Pollen Extracts Runny Nose      Family History   Problem Relation Age of Onset    Bleeding Prob Mother     Parkinsonism Father     Heart Disease Father 62     MI x two    Heart Attack Father       Social History     Social History    Marital status:      Spouse name: N/A    Number of children: N/A    Years of education: N/A     Occupational History    Not on file.      Social History Main Topics    Smoking status: Never Smoker    Smokeless tobacco: Never Used    Alcohol use 8.4 oz/week     14 Cans of beer per week    Drug use: No    Sexual activity: Yes     Partners: Female     Birth control/ protection: Pill     Other Topics Concern    Not on file Social History Narrative      Current Outpatient Prescriptions   Medication Sig    valACYclovir (VALTREX) 500 mg tablet Take 1 Tab by mouth two (2) times a day. (Patient taking differently: Take 500 mg by mouth as needed.)    omega 3-dha-epa-fish oil (FISH OIL) 100-160-1,000 mg cap Take  by mouth two (2) times a day.  coenzyme q10 10 mg cap Take  by mouth daily.  aspirin delayed-release 81 mg tablet Take 1 Tab by mouth daily.  triamcinolone acetonide (KENALOG) 0.1 % topical cream use thin layer (Patient taking differently: four (4) times daily as needed. use thin layer)    metoprolol succinate (TOPROL-XL) 25 mg XL tablet Take 0.5 Tabs by mouth nightly.  apixaban (ELIQUIS) 5 mg tablet Take 1 Tab by mouth every twelve (12) hours. No current facility-administered medications for this visit. Review of Symptoms:  11 systems reviewed, negative other than as stated in the HPI    Physical ExamPhysical Exam:    Vitals:    06/20/18 1413 06/20/18 1423   BP: 132/80 124/72   Pulse: 71    Resp: 16    SpO2: 98%    Weight: 167 lb 3.2 oz (75.8 kg)    Height: 6' 2\" (1.88 m)      Body mass index is 21.47 kg/(m^2). General PE   Gen:  NAD  Mental Status - Alert. General Appearance - Not in acute distress. Chest and Lung Exam   Inspection: Accessory muscles - No use of accessory muscles in breathing. Auscultation:   Breath sounds: - Normal.   Cardiovascular   Inspection: Jugular vein - Bilateral - Inspection Normal.   Palpation/Percussion:   Apical Impulse: - Normal.   Auscultation: Rhythm - Regular. Heart Sounds - S1 WNL and S2 WNL. No S3 or S4. Murmurs & Other Heart Sounds: Auscultation of the heart reveals - No Murmurs. Peripheral Vascular   Upper Extremity: Inspection - Bilateral - No Cyanotic nailbeds or Digital clubbing. Lower Extremity:   Palpation: Edema - Bilateral - No edema. Abdomen:   Soft, non-tender, bowel sounds are active.   Neuro: A&O times 3, CN and motor grossly WNL    Labs: Lab Results   Component Value Date/Time    Cholesterol, total 251 (H) 03/27/2018 09:21 AM    Cholesterol, total 232 (H) 01/20/2017 01:42 PM    Cholesterol, total 204 (H) 05/19/2016 08:26 AM    Cholesterol, total 203 (H) 08/06/2015 03:19 PM    Cholesterol, total 187 05/18/2015 02:31 PM    HDL Cholesterol 74 03/27/2018 09:21 AM    HDL Cholesterol 59 01/20/2017 01:42 PM    HDL Cholesterol 50 05/19/2016 08:26 AM    HDL Cholesterol 59 08/06/2015 03:19 PM    HDL Cholesterol 70 05/18/2015 02:31 PM    LDL, calculated 164 (H) 03/27/2018 09:21 AM    LDL, calculated 159 (H) 01/20/2017 01:42 PM    LDL, calculated 137 (H) 05/19/2016 08:26 AM    LDL, calculated 129 (H) 08/06/2015 03:19 PM    LDL, calculated 108 (H) 05/18/2015 02:31 PM    Triglyceride 67 03/27/2018 09:21 AM    Triglyceride 72 01/20/2017 01:42 PM    Triglyceride 84 05/19/2016 08:26 AM    Triglyceride 74 08/06/2015 03:19 PM    Triglyceride 44 05/18/2015 02:31 PM    CHOL/HDL Ratio 3.1 06/21/2012 04:09 AM     Lab Results   Component Value Date/Time     06/12/2017 10:57 AM     Lab Results   Component Value Date/Time    Sodium 141 03/27/2018 09:21 AM    Potassium 5.2 03/27/2018 09:21 AM    Chloride 100 03/27/2018 09:21 AM    CO2 25 03/27/2018 09:21 AM    Anion gap 6 06/12/2017 10:57 AM    Glucose 88 03/27/2018 09:21 AM    BUN 18 03/27/2018 09:21 AM    Creatinine 0.98 03/27/2018 09:21 AM    BUN/Creatinine ratio 18 03/27/2018 09:21 AM    GFR est  03/27/2018 09:21 AM    GFR est non-AA 86 03/27/2018 09:21 AM    Calcium 9.6 03/27/2018 09:21 AM    Bilirubin, total 0.5 03/27/2018 09:21 AM    AST (SGOT) 22 03/27/2018 09:21 AM    Alk. phosphatase 42 03/27/2018 09:21 AM    Protein, total 7.1 03/27/2018 09:21 AM    Albumin 4.5 03/27/2018 09:21 AM    Globulin 3.7 06/12/2017 10:57 AM    A-G Ratio 1.7 03/27/2018 09:21 AM    ALT (SGPT) 30 03/27/2018 09:21 AM       EKG:  NSR     Assessment:      1. PAF (paroxysmal atrial fibrillation) ([facility]    2.  Mixed hyperlipidemia 3. Aorta coarctation repair age 8        No orders of the defined types were placed in this encounter. Plan:     Patient is a 57-year-old male with a history of MRI-confirmed CVA diagnosed in 2012 and paroxysmal atrial fibrillation that was present on EKG at the time. At that time he was reportedly a heavy drinker. Discussed anticoagulation with Eliquis given elevated [id number] score of 2 (CVA), he prefers to do some research first and then will contact us with his decision. Agrees to start metoprolol XL 12.5 mg nightly and enteric-coated aspirin 81 mg daily. He discussed Eliquis with his sister who is a pharmacist and reportedly was agreeable, but he has not started Eliquis yet. He is still hesitant. Discussed implantable loop recorder if opting not to be anticoagulated initially for surveillance for \ recurrent atrial fibrillation. He wishes to discuss possible implanted loop monitor with Dr. Delfino Jonas in hopes that he may be able to avoid taking long-term anticoagulation. Recent stress test and echo were normal.  Coronary calcium score due to elevated risk factors turned out to be 0. I told him that all of this was comforting and to proceed with healthy diet and exercise, follow-up with Dr. Delfino Jonas.     Follow-up with Dr. Delfino Jonas for new patient consultation for possible ILR, and with me in 6 months.     Lincoln Alexander MD

## 2018-06-26 ENCOUNTER — OFFICE VISIT (OUTPATIENT)
Dept: CARDIOLOGY CLINIC | Age: 58
End: 2018-06-26

## 2018-06-26 VITALS
HEART RATE: 62 BPM | HEIGHT: 74 IN | SYSTOLIC BLOOD PRESSURE: 100 MMHG | RESPIRATION RATE: 16 BRPM | DIASTOLIC BLOOD PRESSURE: 68 MMHG | OXYGEN SATURATION: 99 % | WEIGHT: 164.1 LBS | BODY MASS INDEX: 21.06 KG/M2

## 2018-06-26 DIAGNOSIS — Z86.73 HISTORY OF CVA (CEREBROVASCULAR ACCIDENT): ICD-10-CM

## 2018-06-26 DIAGNOSIS — I48.0 PAF (PAROXYSMAL ATRIAL FIBRILLATION) (HCC): Primary | ICD-10-CM

## 2018-06-26 DIAGNOSIS — E78.2 MIXED HYPERLIPIDEMIA: ICD-10-CM

## 2018-06-26 NOTE — PROGRESS NOTES
1. Have you been to the ER, urgent care clinic since your last visit? Hospitalized since your last visit? No    2. Have you seen or consulted any other health care providers outside of the [facility] since your last visit? Include any pap smears or colon screening. Yes, Dr. Lavell Schwab yesterday at [facility]    Chief Complaint   Patient presents with   [address]     new patient; referred by Dr. Bob Briscoe to discuss implantable loop monitor      Pt denies any cardiac complaints today.

## 2018-06-26 NOTE — PROGRESS NOTES
Subjective:      Jayleen Giron is a 56 y.o. male is here for EP consult. He has had a cva in 2012 and was found to be in AF with rvr. He is referred to me for ILR for AF burden monitoring. The patient denies chest pain/ shortness of breath, orthopnea, PND, LE edema, palpitations, syncope, presyncope or fatigue.        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/28/2012    Dizziness 06/19/2012    Persistent vomiting 06/19/2012    Aorta coarctation repair age 8 06/30/2011    Migraine 06/01/2011    Injury of right foot 03/31/2011    Acute bronchitis 01/28/2011    Hyperlipidemia 06/24/2010    Nephrolithiasis 05/26/2010      Henna Rojas MD  Past Medical History:   Diagnosis Date    Calculus of kidney     [facility] failure ([facility]     Hyperlipidemia 6/24/2010    Hyperlipidemia 6/24/2010    Migraine 6/1/2011    Nephrolithiasis 5/26/2010    Other ill-defined conditions(799.89)     etoh abuse    PAF (paroxysmal atrial fibrillation) ([facility]     Proteinuria 5/26/2010    Psychiatric disorder     depression      Past Surgical History:   Procedure Laterality Date    CARDIAC SURG PROCEDURE UNLIST      sx for coarctaion of aorta    HX ORTHOPAEDIC      shoulder sx     Allergies   Allergen Reactions    Pollen Extracts Runny Nose      Family History   Problem Relation Age of Onset    Bleeding Prob Mother     Parkinsonism Father     Heart Disease Father 62     MI x two    Heart Attack Father     negative for cardiac disease  Social History     Social History    Marital status:      Spouse name: N/A    Number of children: N/A    Years of education: N/A     Social History Main Topics    Smoking status: Never Smoker    Smokeless tobacco: Never Used    Alcohol use 8.4 oz/week     14 Cans of beer per week    Drug use: No    Sexual activity: Yes     Partners: Female     Birth control/ protection: Pill Other Topics Concern    None     Social History Narrative     Current Outpatient Prescriptions   Medication Sig    valACYclovir (VALTREX) 500 mg tablet Take 1 Tab by mouth two (2) times a day. (Patient taking differently: Take 500 mg by mouth as needed.)    omega 3-dha-epa-fish oil (FISH OIL) 100-160-1,000 mg cap Take  by mouth two (2) times a day.  coenzyme q10 10 mg cap Take  by mouth daily.  aspirin delayed-release 81 mg tablet Take 1 Tab by mouth daily.  triamcinolone acetonide (KENALOG) 0.1 % topical cream use thin layer (Patient taking differently: four (4) times daily as needed. use thin layer)    metoprolol succinate (TOPROL-XL) 25 mg XL tablet Take 0.5 Tabs by mouth nightly.  apixaban (ELIQUIS) 5 mg tablet Take 1 Tab by mouth every twelve (12) hours. No current facility-administered medications for this visit. Vitals:    06/26/18 1044   BP: 100/68   Pulse: 62   Resp: 16   SpO2: 99%   Weight: 164 lb 1.6 oz (74.4 kg)   Height: 6' 2\" (1.88 m)       I have reviewed the nurses notes, vitals, problem list, allergy list, medical history, family, social history and medications. Review of Symptoms:    General: Pt denies excessive weight gain or loss. Pt is able to conduct ADL's  HEENT: Denies blurred vision, headaches, epistaxis and difficulty swallowing. Respiratory: Denies shortness of breath, MARTIN, wheezing or stridor. Cardiovascular: Denies precordial pain, palpitations, edema or PND  Gastrointestinal: Denies poor appetite, indigestion, abdominal pain or blood in stool  Urinary: Denies dysuria, pyuria  Musculoskeletal: Denies pain or swelling from muscles or joints  Neurologic: Denies tremor, paresthesias, or sensory motor disturbance  Skin: Denies rash, itching or texture change. Psych: Denies depression      Physical Exam:      General: Well developed, in no acute distress. HEENT: Eyes - PERRL, no jvd  Heart:  Normal S1/S2 negative S3 or S4.  Regular, no murmur, gallop or rub.   Respiratory: Clear bilaterally x 4, no wheezing or rales  Abdomen:   Soft, non-tender, bowel sounds are active.   Extremities:  No edema, normal cap refill, no cyanosis. Musculoskeletal: No clubbing  Neuro: A&Ox3, speech clear, gait stable. Skin: Skin color is normal. No rashes or lesions. Non diaphoretic  Vascular: 2+ pulses symmetric in all extremities    Cardiographics    Ekg: nsr    Results for orders placed or performed during the hospital encounter of 06/12/17   EKG, 12 LEAD, INITIAL   Result Value Ref Range    Ventricular Rate 67 BPM    Atrial Rate 67 BPM    P-R Interval 138 ms    QRS Duration 114 ms    Q-T Interval 410 ms    QTC Calculation (Bezet) 433 ms    Calculated P Axis 80 degrees    Calculated R Axis 98 degrees    Calculated T Axis 44 degrees    Diagnosis       Normal sinus rhythm  Possible Left atrial enlargement  Rightward axis  Incomplete right bundle branch block  When compared with ECG of 23-JUN-2012 06:41,  No significant change was found  Confirmed by Joaquina Archuleta ([id number]) on 6/12/2017 4:35:56 PM           Lab Results   Component Value Date/Time    WBC 7.3 06/12/2017 10:57 AM    HGB 15.3 06/12/2017 10:57 AM    HCT 43.3 06/12/2017 10:57 AM    PLATELET 259 90/12/5897 10:57 AM    MCV 85.6 06/12/2017 10:57 AM      Lab Results   Component Value Date/Time    Sodium 141 03/27/2018 09:21 AM    Potassium 5.2 03/27/2018 09:21 AM    Chloride 100 03/27/2018 09:21 AM    CO2 25 03/27/2018 09:21 AM    Anion gap 6 06/12/2017 10:57 AM    Glucose 88 03/27/2018 09:21 AM    BUN 18 03/27/2018 09:21 AM    Creatinine 0.98 03/27/2018 09:21 AM    BUN/Creatinine ratio 18 03/27/2018 09:21 AM    GFR est  03/27/2018 09:21 AM    GFR est non-AA 86 03/27/2018 09:21 AM    Calcium 9.6 03/27/2018 09:21 AM    Bilirubin, total 0.5 03/27/2018 09:21 AM    AST (SGOT) 22 03/27/2018 09:21 AM    Alk.  phosphatase 42 03/27/2018 09:21 AM    Protein, total 7.1 03/27/2018 09:21 AM    Albumin 4.5 03/27/2018 09:21 AM Globulin 3.7 06/12/2017 10:57 AM    A-G Ratio 1.7 03/27/2018 09:21 AM    ALT (SGPT) 30 03/27/2018 09:21 AM         Assessment:     Assessment:        ICD-10-CM ICD-9-CM    1. PAF (paroxysmal atrial fibrillation) (HCC) I48.0 427.31 AMB POC EKG ROUTINE W/ 12 LEADS, INTER & REP   2. History of CVA (cerebrovascular accident) Z86.73 V12.54      Orders Placed This Encounter    AMB POC EKG ROUTINE W/ 12 LEADS, INTER & REP     Order Specific Question:   Reason for Exam:     Answer:   Routine        Plan:   Mr Jose E Talavera is a pleasant gentleman with a hx of cva and af. He is hesistant to take oacs - I explained to him that he has a cahdsvasc of 2 and needs an oac. He is a candidate for an ILR to assess his AF burden. I discussed the risks/benefits/alternatives of the procedure with the patient. Risks include (but are not limited to) bleeding, heart block, infection, cva/mi/tamponade/death. The patient understands and would like to think about it. Thank you for this interesting consultation. Continue medical management for hyperlipidemia, af and cva. Thank you for allowing me to participate in Timi Roblero 's care.     Italia Negrete MD, Andrzej Frey

## 2018-08-17 LAB
ALBUMIN SERPL-MCNC: 4.2 G/DL (ref 3.5–5.5)
ALBUMIN/GLOB SERPL: 1.7 {RATIO} (ref 1.2–2.2)
ALP SERPL-CCNC: 42 IU/L (ref 39–117)
ALT SERPL-CCNC: 19 IU/L (ref 0–44)
AST SERPL-CCNC: 24 IU/L (ref 0–40)
BILIRUB SERPL-MCNC: 0.7 MG/DL (ref 0–1.2)
BUN SERPL-MCNC: 19 MG/DL (ref 6–24)
BUN/CREAT SERPL: 19 (ref 9–20)
CALCIUM SERPL-MCNC: 9.2 MG/DL (ref 8.7–10.2)
CHLORIDE SERPL-SCNC: 104 MMOL/L (ref 96–106)
CHOLEST SERPL-MCNC: 196 MG/DL (ref 100–199)
CO2 SERPL-SCNC: 23 MMOL/L (ref 20–29)
CREAT SERPL-MCNC: 0.98 MG/DL (ref 0.76–1.27)
GLOBULIN SER CALC-MCNC: 2.5 G/DL (ref 1.5–4.5)
GLUCOSE SERPL-MCNC: 84 MG/DL (ref 65–99)
HDLC SERPL-MCNC: 58 MG/DL
INTERPRETATION, 910389: NORMAL
LDLC SERPL CALC-MCNC: 128 MG/DL (ref 0–99)
POTASSIUM SERPL-SCNC: 4.4 MMOL/L (ref 3.5–5.2)
PROT SERPL-MCNC: 6.7 G/DL (ref 6–8.5)
SODIUM SERPL-SCNC: 141 MMOL/L (ref 134–144)
TRIGL SERPL-MCNC: 49 MG/DL (ref 0–149)
VLDLC SERPL CALC-MCNC: 10 MG/DL (ref 5–40)

## 2018-08-23 ENCOUNTER — TELEPHONE (OUTPATIENT)
Dept: CARDIOLOGY CLINIC | Age: 58
End: 2018-08-23

## 2018-08-23 NOTE — TELEPHONE ENCOUNTER
----- Message from Ki Friend MD sent at 8/23/2018  4:03 PM EDT -----  LDL (bad) cholesterol much improved to 164-128proceed with healthy diet and exercise, follow-up in 6-12 months if doing well.

## 2018-08-23 NOTE — PROGRESS NOTES
LDL (bad) cholesterol much improved to 164-128proceed with healthy diet and exercise, follow-up in 6-12 months if doing well.

## 2018-08-23 NOTE — TELEPHONE ENCOUNTER
----- Message from Martina Graham MD sent at 8/23/2018  4:03 PM EDT -----  LDL (bad) cholesterol much improved to 164-128proceed with healthy diet and exercise, follow-up in 6-12 months if doing well.

## 2018-10-10 ENCOUNTER — TELEPHONE (OUTPATIENT)
Dept: INTERNAL MEDICINE CLINIC | Age: 58
End: 2018-10-10

## 2018-10-10 NOTE — TELEPHONE ENCOUNTER
Identified patient 2 identifiers verified. Patient  Was given the name of Dr. Mauro Tompkins and also to contact pharmacy for shingle vaccine.

## 2018-10-10 NOTE — TELEPHONE ENCOUNTER
Patient states he needs a call back to get an appt to get his Shingles Vaccination & also needs to discuss getting referral for dermatologist. Please call.  Thank you

## 2018-10-24 ENCOUNTER — OFFICE VISIT (OUTPATIENT)
Dept: INTERNAL MEDICINE CLINIC | Age: 58
End: 2018-10-24

## 2018-10-24 VITALS
HEIGHT: 74 IN | DIASTOLIC BLOOD PRESSURE: 73 MMHG | TEMPERATURE: 98.4 F | OXYGEN SATURATION: 97 % | BODY MASS INDEX: 21.3 KG/M2 | SYSTOLIC BLOOD PRESSURE: 121 MMHG | HEART RATE: 71 BPM | RESPIRATION RATE: 16 BRPM | WEIGHT: 166 LBS

## 2018-10-24 DIAGNOSIS — L03.031 PARONYCHIA OF GREAT TOE, RIGHT: Primary | ICD-10-CM

## 2018-10-24 DIAGNOSIS — I48.0 PAF (PAROXYSMAL ATRIAL FIBRILLATION) (HCC): ICD-10-CM

## 2018-10-24 RX ORDER — CEPHALEXIN 500 MG/1
500 CAPSULE ORAL 4 TIMES DAILY
Qty: 28 CAP | Refills: 0 | Status: SHIPPED | OUTPATIENT
Start: 2018-10-24 | End: 2018-10-31

## 2018-10-24 NOTE — PROGRESS NOTES
HISTORY OF PRESENT ILLNESS Bubba Rivera is a 66 y.o. male. HPI Pt normally follows with Dr. Edwina Guzman (PCP). Pt is here for acute care. Pt c/o x discoloration to R great toe x 2 weeks Pt was wearing tight shoes and noticed that he started having pain, so he stopped wearing those shoes However, his toe started getting red last week, and the redness then moved to the top Pt did not take anything for his sx Denies F/C, N/V He has had some pain with walking Discussed that he has an ingrown toenail and paronychia Will give keflex to take QID Advised warm soaks Pt is not taking eliquis States he got his cholesterol down so he did not need to start this Pt saw Dr Quin Yanez, who wanted to do an implantable loop monitor - this did not happen Pt has a h/o stroke in 2012 with a fib Reviewed notes Dr Paxton Rodriguez: confirm stroke related to paroxysmal a fib, pt did not take eliquis Will give shingrix rx per patient request 
 
Patient Active Problem List  
 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  coenzyme q10 10 mg cap Take  by mouth daily.  aspirin delayed-release 81 mg tablet Take 1 Tab by mouth daily.  valACYclovir (VALTREX) 500 mg tablet Take 1 Tab by mouth two (2) times a day. (Patient taking differently: Take 500 mg by mouth as needed.) 60 Tab 6  
 metoprolol succinate (TOPROL-XL) 25 mg XL tablet Take 0.5 Tabs by mouth nightly.  15 Tab 3  
  apixaban (ELIQUIS) 5 mg tablet Take 1 Tab by mouth every twelve (12) hours. 60 Tab 6  
 triamcinolone acetonide (KENALOG) 0.1 % topical cream use thin layer (Patient taking differently: four (4) times daily as needed. use thin layer) 15 g 0 Past Surgical History:  
Procedure Laterality Date  CARDIAC SURG PROCEDURE UNLIST    
 sx for coarctaion of aorta  HX ORTHOPAEDIC    
 shoulder sx Lab Results Component Value Date/Time WBC 7.3 06/12/2017 10:57 AM  
 HGB 15.3 06/12/2017 10:57 AM  
 HCT 43.3 06/12/2017 10:57 AM  
 PLATELET 485 17/55/6334 10:57 AM  
 MCV 85.6 06/12/2017 10:57 AM  
 
Lab Results Component Value Date/Time Cholesterol, total 196 08/16/2018 07:51 AM  
 HDL Cholesterol 58 08/16/2018 07:51 AM  
 LDL, calculated 128 (H) 08/16/2018 07:51 AM  
 Triglyceride 49 08/16/2018 07:51 AM  
 CHOL/HDL Ratio 3.1 06/21/2012 04:09 AM  
 
Lab Results Component Value Date/Time GFR est non-AA 86 08/16/2018 07:51 AM  
 GFRNA, POC >60 06/19/2012 09:33 AM  
 GFR est  08/16/2018 07:51 AM  
 GFRAA, POC >60 06/19/2012 09:33 AM  
 Creatinine 0.98 08/16/2018 07:51 AM  
 Creatinine (POC) 0.9 06/19/2012 09:33 AM  
 BUN 19 08/16/2018 07:51 AM  
 Sodium 141 08/16/2018 07:51 AM  
 Potassium 4.4 08/16/2018 07:51 AM  
 Chloride 104 08/16/2018 07:51 AM  
 CO2 23 08/16/2018 07:51 AM  
  
Review of Systems Constitutional: Negative for chills and fever. Gastrointestinal: Negative for nausea and vomiting. Physical Exam  
Constitutional: He is oriented to person, place, and time. He appears well-developed and well-nourished. No distress. HENT:  
Head: Normocephalic and atraumatic. Eyes: Conjunctivae and EOM are normal. Right eye exhibits no discharge. Left eye exhibits no discharge. Neck: Normal range of motion. Neck supple. Cardiovascular: Normal rate, regular rhythm and normal heart sounds. Exam reveals no gallop and no friction rub. No murmur heard. Pulmonary/Chest: Effort normal and breath sounds normal. No respiratory distress. He has no wheezes. He has no rales. He exhibits no tenderness. Musculoskeletal: Normal range of motion. He exhibits no edema, tenderness or deformity. Lymphadenopathy:  
  He has no cervical adenopathy. Neurological: He is alert and oriented to person, place, and time. He has normal reflexes. Coordination normal.  
Skin: Skin is warm and dry. No rash noted. He is not diaphoretic. No erythema. No pallor. Mild erythema and TTP at base of R great toenail bed Psychiatric: He has a normal mood and affect. His behavior is normal.  
 
 
ASSESSMENT and PLAN 
  ICD-10-CM ICD-9-CM 1. Paronychia of great toe, right Will treat with keflex QID and warm soaks QID, podiatry if not improving L03.031 681.11   
2. PAF (paroxysmal atrial fibrillation) ([facility] [address] Pt has h/o stroke from a fib in the past, he was seen by both Dena and Micky Patterson, he was supposed to get loop monitor to determine his burden of a fib but he did not pursue this, he never took his toprol or eliquis either, discussed risk of stroke, discussed f/u with cardio to discuss this further I48.0 427.31 Scribed by Savana Askew [address], as dictated by Dr. Nai Watson. Current diagnosis and concerns discussed with pt at length. Pt understands risks and benefits or current treatment plan and medications, and accepts the treatment and medication with any possible risks. Pt asks appropriate questions, which were answered. Pt was instructed to call with any concerns or problems. I have reviewed the note documented by the scribe. The services provided are my own. The documentation is accurate This note will not be viewable in [employer].

## 2018-12-13 RX ORDER — VALACYCLOVIR HYDROCHLORIDE 500 MG/1
500 TABLET, FILM COATED ORAL 2 TIMES DAILY
Qty: 60 TAB | Refills: 6 | Status: SHIPPED | OUTPATIENT
Start: 2018-12-13 | End: 2019-07-19 | Stop reason: SDUPTHER

## 2019-01-28 ENCOUNTER — TELEPHONE (OUTPATIENT)
Dept: INTERNAL MEDICINE CLINIC | Age: 59
End: 2019-01-28

## 2019-01-28 ENCOUNTER — OFFICE VISIT (OUTPATIENT)
Dept: URGENT CARE | Age: 59
End: 2019-01-28

## 2019-01-28 VITALS
BODY MASS INDEX: 21.94 KG/M2 | RESPIRATION RATE: 16 BRPM | HEIGHT: 74 IN | SYSTOLIC BLOOD PRESSURE: 129 MMHG | HEART RATE: 83 BPM | WEIGHT: 171 LBS | TEMPERATURE: 97.2 F | OXYGEN SATURATION: 99 % | DIASTOLIC BLOOD PRESSURE: 94 MMHG

## 2019-01-28 DIAGNOSIS — J06.9 VIRAL URI WITH COUGH: Primary | ICD-10-CM

## 2019-01-28 LAB
S PYO AG THROAT QL: NEGATIVE
VALID INTERNAL CONTROL?: YES

## 2019-01-28 RX ORDER — BENZONATATE 200 MG/1
200 CAPSULE ORAL
Qty: 21 CAP | Refills: 0 | Status: SHIPPED | OUTPATIENT
Start: 2019-01-28 | End: 2019-02-04

## 2019-01-28 RX ORDER — FLUTICASONE PROPIONATE 50 MCG
2 SPRAY, SUSPENSION (ML) NASAL DAILY
Qty: 1 BOTTLE | Refills: 0 | Status: SHIPPED | OUTPATIENT
Start: 2019-01-28

## 2019-01-28 NOTE — TELEPHONE ENCOUNTER
Patient states he needs a call back in reference to getting an Acute appt today for Cold symptoms with cough with colored mucus. Please call to advise. Patient was advised of the [facility] as an option for an appt as no available appts on scheduled at this time.  Thank you

## 2019-01-28 NOTE — PROGRESS NOTES
Cold Symptoms   The history is provided by the patient. This is a new problem. The current episode started 2 days ago. The problem occurs constantly. The problem has not changed since onset. The cough is productive of sputum. There has been no fever. Associated symptoms include rhinorrhea and sore throat. Pertinent negatives include no chills, no shortness of breath and no wheezing. He has tried cough syrup for the symptoms. He is not a smoker. His past medical history is significant for bronchitis. Past Medical History:   Diagnosis Date    Calculus of kidney     Heart failure ([address]     Hyperlipidemia 6/24/2010    Hyperlipidemia 6/24/2010    Migraine 6/1/2011    Nephrolithiasis 5/26/2010    Other ill-defined conditions(799.89)     etoh abuse    [facility]     Proteinuria 5/26/2010    Psychiatric disorder     depression        Past Surgical History:   Procedure Laterality Date    CARDIAC SURG PROCEDURE UNLIST      sx for coarctaion of aorta    HX ORTHOPAEDIC      shoulder sx         Family History   Problem Relation Age of Onset    Bleeding Prob Mother     Parkinsonism Father     Heart Disease Father 62        MI x two    Heart Attack Father         Social History     Socioeconomic History    Marital status:      Spouse name: Not on file    Number of children: Not on file    Years of education: Not on file    Highest education level: Not on file   Social Needs    Financial resource strain: Not on file    Food insecurity - worry: Not on file    Food insecurity - inability: Not on file   [employer] needs - medical: Not on file   [employer] needs - non-medical: Not on file   Occupational History    Not on file   Tobacco Use    Smoking status: Never Smoker    Smokeless tobacco: Never Used   Substance and Sexual Activity    Alcohol use:  Yes     Alcohol/week: 8.4 oz     Types: 14 Cans of beer per week    Drug use: No    Sexual activity: Yes     Partners: Female     Birth control/protection: Pill   Other Topics Concern    Not on file   Social History Narrative    Not on file                ALLERGIES: Pollen extracts    Review of Systems   Constitutional: Negative for chills. HENT: Positive for rhinorrhea and sore throat. Respiratory: Negative for shortness of breath and wheezing. All other systems reviewed and are negative. Vitals:    19 1416   BP: (!) 129/94   Pulse: 83   Resp: 16   Temp: 97.2 °F (36.2 °C)   SpO2: 99%   Weight: 171 lb (77.6 kg)   Height: 6' 2\" (1.88 m)       Physical Exam   Constitutional: No distress. HENT:   Right Ear: Tympanic membrane and ear canal normal.   Left Ear: Tympanic membrane and ear canal normal.   Nose: Nose normal.   Mouth/Throat: No oropharyngeal exudate, posterior oropharyngeal edema or posterior oropharyngeal erythema. Eyes: Conjunctivae are normal. Right eye exhibits no discharge. Left eye exhibits no discharge. Neck: Neck supple. Pulmonary/Chest: Effort normal and breath sounds normal. No respiratory distress. He has no wheezes. He has no rales. Lymphadenopathy:     He has no cervical adenopathy. Skin: No rash noted. Nursing note and vitals reviewed. MDM    Procedures                       ICD-10-CM ICD-9-CM    1. Viral URI with cough J06.9 465.9 AMB POC RAPID STREP A    B97.89     Fluids/ gargles  Claritin/ allegra   Tylenol cold-sinus - max strength 1-2 tab 4 times/ day    with Advil as needed  Use Mucinex DM twice a day    Medications Ordered Today   Medications    fluticasone (FLONASE) 50 mcg/actuation nasal spray     Si Sprays by Both Nostrils route daily. Dispense:  1 Bottle     Refill:  0    benzonatate (TESSALON) 200 mg capsule     Sig: Take 1 Cap by mouth three (3) times daily as needed for Cough for up to 7 days.      Dispense:  21 Cap     Refill:  0     Results for orders placed or performed in visit on 19   AMB POC RAPID STREP A   Result Value Ref Range    VALID INTERNAL CONTROL POC Yes     Group A Strep Ag Negative Negative     The patients condition was discussed with the patient and they understand. The patient is to follow up with primary care doctor. If signs and symptoms become worse the pt is to go to the ER. The patient is to take medications as prescribed.

## 2019-01-28 NOTE — PATIENT INSTRUCTIONS
Fluids/ gargles  Claritin/ allegra   Tylenol cold-sinus - max strength 1-2 tab 4 times/ day    with Advil as needed    If not better in 4-5 days - may use Mucinex DM        Upper Respiratory Infection (Cold): Care Instructions  Your Care Instructions    An upper respiratory infection, or URI, is an infection of the nose, sinuses, or throat. URIs are spread by coughs, sneezes, and direct contact. The common cold is the most frequent kind of URI. The flu and sinus infections are other kinds of URIs. Almost all URIs are caused by viruses. Antibiotics won't cure them. But you can treat most infections with home care. This may include drinking lots of fluids and taking over-the-counter pain medicine. You will probably feel better in 4 to 10 days. The doctor has checked you carefully, but problems can develop later. If you notice any problems or new symptoms, get medical treatment right away. Follow-up care is a key part of your treatment and safety. Be sure to make and go to all appointments, and call your doctor if you are having problems. It's also a good idea to know your test results and keep a list of the medicines you take. How can you care for yourself at home? · To prevent dehydration, drink plenty of fluids, enough so that your urine is light yellow or clear like water. Choose water and other caffeine-free clear liquids until you feel better. If you have kidney, heart, or liver disease and have to limit fluids, talk with your doctor before you increase the amount of fluids you drink. · Take an over-the-counter pain medicine, such as acetaminophen (Tylenol), ibuprofen (Advil, Motrin), or naproxen (Aleve). Read and follow all instructions on the label. · Before you use cough and cold medicines, check the label. These medicines may not be safe for young children or for people with certain health problems. · Be careful when taking over-the-counter cold or flu medicines and Tylenol at the same time.  Many of these medicines have acetaminophen, which is Tylenol. Read the labels to make sure that you are not taking more than the recommended dose. Too much acetaminophen (Tylenol) can be harmful. · Get plenty of rest.  · Do not smoke or allow others to smoke around you. If you need help quitting, talk to your doctor about stop-smoking programs and medicines. These can increase your chances of quitting for good. When should you call for help? Call 911 anytime you think you may need emergency care. For example, call if:    · You have severe trouble breathing.    Call your doctor now or seek immediate medical care if:    · You seem to be getting much sicker.     · You have new or worse trouble breathing.     · You have a new or higher fever.     · You have a new rash.    Watch closely for changes in your health, and be sure to contact your doctor if:    · You have a new symptom, such as a sore throat, an earache, or sinus pain.     · You cough more deeply or more often, especially if you notice more mucus or a change in the color of your mucus.     · You do not get better as expected. Where can you learn more? Go to http://hu-wild.info/. Enter T438 in the search box to learn more about \"Upper Respiratory Infection (Cold): Care Instructions. \"  Current as of: September 5, 2018  Content Version: 11.9  © 2772-7898 [employer]. Care instructions adapted under license by [employer] (which disclaims liability or warranty for this information). If you have questions about a medical condition or this instruction, always ask your healthcare professional. [place] [address] any warranty or liability for your use of this information.

## 2019-01-28 NOTE — TELEPHONE ENCOUNTER
Identified patient 2 identifiers verified. Patient was seen at [facility] today.

## 2019-07-19 ENCOUNTER — OFFICE VISIT (OUTPATIENT)
Dept: INTERNAL MEDICINE CLINIC | Age: 59
End: 2019-07-19

## 2019-07-19 VITALS
WEIGHT: 167 LBS | RESPIRATION RATE: 16 BRPM | HEIGHT: 74 IN | OXYGEN SATURATION: 100 % | DIASTOLIC BLOOD PRESSURE: 79 MMHG | BODY MASS INDEX: 21.43 KG/M2 | TEMPERATURE: 97.7 F | SYSTOLIC BLOOD PRESSURE: 117 MMHG | HEART RATE: 69 BPM

## 2019-07-19 DIAGNOSIS — E78.2 MIXED HYPERLIPIDEMIA: ICD-10-CM

## 2019-07-19 DIAGNOSIS — R05.9 COUGH: ICD-10-CM

## 2019-07-19 DIAGNOSIS — J01.00 ACUTE NON-RECURRENT MAXILLARY SINUSITIS: Primary | ICD-10-CM

## 2019-07-19 RX ORDER — TRIAMCINOLONE ACETONIDE 1 MG/G
CREAM TOPICAL
Qty: 45 G | Refills: 0 | Status: SHIPPED | OUTPATIENT
Start: 2019-07-19

## 2019-07-19 RX ORDER — BENZONATATE 200 MG/1
200 CAPSULE ORAL
Qty: 21 CAP | Refills: 1 | Status: SHIPPED | OUTPATIENT
Start: 2019-07-19 | End: 2019-07-26

## 2019-07-19 RX ORDER — VALACYCLOVIR HYDROCHLORIDE 500 MG/1
500 TABLET, FILM COATED ORAL 2 TIMES DAILY
Qty: 60 TAB | Refills: 6 | Status: SHIPPED | OUTPATIENT
Start: 2019-07-19 | End: 2019-10-22 | Stop reason: SDUPTHER

## 2019-07-19 RX ORDER — AZITHROMYCIN 250 MG/1
250 TABLET, FILM COATED ORAL SEE ADMIN INSTRUCTIONS
Qty: 6 TAB | Refills: 0 | Status: SHIPPED | OUTPATIENT
Start: 2019-07-19 | End: 2020-09-29 | Stop reason: SDUPTHER

## 2019-07-19 NOTE — PROGRESS NOTES
Identified pt with two pt identifiers(name and ). Reviewed record in preparation for visit and have obtained necessary documentation. Chief Complaint   Patient presents with    Cold Symptoms     Pt reports chest congestion, face congestion, running nose, and a productive cough. Pt started started 6 days ago and feels it is getting worse.  Labs     Pt requesting to have cholesterol check       Visit Vitals  /79 (BP 1 Location: Left arm, BP Patient Position: Sitting)   Pulse 69   Temp 97.7 °F (36.5 °C) (Oral)   Resp 16   Ht 6' 2\" (1.88 m)   Wt 167 lb (75.8 kg)   SpO2 100%   BMI 21.44 kg/m²     Health Maintenance Due   Topic    DTaP/Tdap/Td series (1 - Tdap)    COLONOSCOPY     Shingrix Vaccine Age 49> (2 of 2)       Coordination of Care Questionnaire:  :   1) Have you been to an emergency room, urgent care, or hospitalized since your last visit? If yes, where when, and reason for visit? No         2. Have seen or consulted any other health care provider since your last visit? If yes, where when, and reason for visit? No       3) Do you have an Advanced Directive/ Living Will in place? No  If yes, do we have a copy on file   If no, would you like information     Patient is accompanied by self I have received verbal consent from Rory Barnhart to discuss any/all medical information while they are present in the room.

## 2019-07-19 NOTE — PROGRESS NOTES
SUBJECTIVE:   Mr. Nicolas Montes is a 66 y.o. male who is here c/o URI sx for 6 days. Pt reports that he started with throat clearing and dry cough, as well as a sensation of chest congestion. He endorses nasal congestion, rhinorrhea, and cough which is productive of yellow sputum since yesterday. He reports mild left frontal HA and sinus pressure today. He denies fever, chills, sore throat, nausea. He has taken beckie seltzer without relief. He used a sinus rinse last night which helped him to sleep, but sx worsened again today. He denies known sick contacts. At this time, he is otherwise doing well and has brought no other complaints to my attention today. PMH:   Past Medical History:   Diagnosis Date    Calculus of kidney     Heart failure ([facility] [address]     Hyperlipidemia 6/24/2010    Hyperlipidemia 6/24/2010    Migraine 6/1/2011    Nephrolithiasis 5/26/2010    Other ill-defined conditions(799.89)     etoh abuse    [facility]     Proteinuria 5/26/2010    Psychiatric disorder     depression     PSH:  has a past surgical history that includes pr cardiac surg procedure unlist and hx orthopaedic. All: is allergic to pollen extracts. MEDS:   Current Outpatient Medications   Medication Sig    valACYclovir (VALTREX) 500 mg tablet Take 1 Tab by mouth two (2) times a day.  triamcinolone acetonide (KENALOG) 0.1 % topical cream Apply  to affected area four (4) times daily as needed for Skin Irritation. use thin layer    azithromycin (ZITHROMAX) 250 mg tablet Take 1 Tab by mouth See Admin Instructions for 5 days.  benzonatate (TESSALON) 200 mg capsule Take 1 Cap by mouth three (3) times daily as needed for Cough for up to 7 days.  omega 3-dha-epa-fish oil (FISH OIL) 100-160-1,000 mg cap Take  by mouth two (2) times a day.  coenzyme q10 10 mg cap Take  by mouth daily.  aspirin delayed-release 81 mg tablet Take 1 Tab by mouth daily.     fluticasone (FLONASE) 50 mcg/actuation nasal spray 2 Sprays by Both Nostrils route daily. (Patient not taking: Reported on 7/19/2019)     No current facility-administered medications for this visit. FH: family history includes Bleeding Prob in his mother; Heart Attack in his father; Heart Disease (age of onset: 62) in his father; Parkinsonism in his father. SH:  reports that he has never smoked. He has never used smokeless tobacco. He reports that he drinks about 14.0 standard drinks of alcohol per week. He reports that he does not use drugs. Review of Systems - History obtained from the patient  General ROS: negative  Psychological ROS: negative  Ophthalmic ROS: negative  ENT ROS: +nasal congestion, rhinorrhea, sinus pressure, throat clearing no sore throat  Respiratory ROS: +productive cough, no shortness of breath or wheezing  Cardiovascular ROS: no chest pain or dyspnea on exertion  Gastrointestinal ROS: no abdominal pain, change in bowel habits, or black or bloody stools  Genito-Urinary ROS: negative  Musculoskeletal ROS: negative  Neurological ROS: negative  Dermatological ROS: negative    OBJECTIVE:   Vitals:   Visit Vitals  /79 (BP 1 Location: Left arm, BP Patient Position: Sitting)   Pulse 69   Temp 97.7 °F (36.5 °C) (Oral)   Resp 16   Ht 6' 2\" (1.88 m)   Wt 167 lb (75.8 kg)   SpO2 100%   BMI 21.44 kg/m²      Gen: Pleasant 66 y.o.  male in NAD. HEENT: NC/AT. TMs and EACs normal bilaterally. +Erythematous and swollen bilaterally. Posterior pharynx and oral mucosa normal. +Mild left frontal and maxillary sinus TTP. NECK: Supple, full ROM. No thyromegaly. Trachea midline. No cervical LAD. HEART: RRR, No M/G/R.   LUNGS: CTAB No W/R. EXTREMITIES: Warm. No C/C/E.   NEURO: Alert and oriented x 3. Cranial nerves grossly intact. No focal sensory or motor deficits noted. SKIN: Warm. Dry. No rashes or other lesions noted. ASSESSMENT/ PLAN:     Diagnoses and all orders for this visit:    1.  Acute non-recurrent maxillary sinusitis  -     azithromycin (ZITHROMAX) 250 mg tablet; Take 1 Tab by mouth See Admin Instructions for 5 days. 2. Cough  -     benzonatate (TESSALON) 200 mg capsule; Take 1 Cap by mouth three (3) times daily as needed for Cough for up to 7 days. Other orders  -     valACYclovir (VALTREX) 500 mg tablet; Take 1 Tab by mouth two (2) times a day. -     triamcinolone acetonide (KENALOG) 0.1 % topical cream; Apply  to affected area four (4) times daily as needed for Skin Irritation. use thin layer       1. Acute non-recurrent maxillary sinusitis  Prescribed azithromycin. 2. Cough. Prescribed tessalon perles. Provided refills for valacyclovir and triamcinalone. Follow-up and Dispositions    · Return if symptoms worsen or fail to improve. I have reviewed the patient's medications and risks/side effects/benefits were discussed. Diagnosis(-es) explained to patient and questions answered. Literature provided where appropriate.      Written by Yordy Jerome, as dictated by Carmen Garcia MD.

## 2019-07-23 DIAGNOSIS — J01.00 ACUTE NON-RECURRENT MAXILLARY SINUSITIS: ICD-10-CM

## 2019-07-23 RX ORDER — AMOXICILLIN 875 MG/1
875 TABLET, FILM COATED ORAL 2 TIMES DAILY
Qty: 14 TAB | Refills: 0 | Status: SHIPPED | OUTPATIENT
Start: 2019-07-23 | End: 2019-07-30

## 2019-07-23 RX ORDER — AZITHROMYCIN 250 MG/1
250 TABLET, FILM COATED ORAL SEE ADMIN INSTRUCTIONS
Qty: 6 TAB | Refills: 0 | Status: CANCELLED | OUTPATIENT
Start: 2019-07-23 | End: 2019-07-28

## 2019-07-23 NOTE — TELEPHONE ENCOUNTER
Patient states since starting zpak he's experienced little relief. +productive cough (yellow/brown sputum), coughing, experienced SOB & chest pain/tightness [place], but was at an elevated altitude. This resolved when he came back home. Denies f/c & sinus pressure has resolved. Explained that the azithromycin will remain in his system after completing 5 day course & recommended mucinex to help congestion. Patient requests Dr. Aiyana Ken to review sxs to further advise if another abx is recommended, as he's leaving for vacation tomorrow.

## 2019-07-23 NOTE — TELEPHONE ENCOUNTER
Pt states he is not feeling very well yet and is asking for a refill on the antibiotic   Please call with any questions.

## 2019-10-21 ENCOUNTER — TELEPHONE (OUTPATIENT)
Dept: INTERNAL MEDICINE CLINIC | Age: 59
End: 2019-10-21

## 2019-10-21 NOTE — TELEPHONE ENCOUNTER
Pt wants to be seen ASAP for rash on his leg. It is broken out. Pt thinks it might be shingles. Please call to schedule appt. Pt does not mind seeing another doctor.

## 2019-10-21 NOTE — TELEPHONE ENCOUNTER
Identified patient 2 identifiers verified.   Appointment accepted with Dr. Inderjit Torres on 10/22/19

## 2019-10-22 ENCOUNTER — OFFICE VISIT (OUTPATIENT)
Dept: INTERNAL MEDICINE CLINIC | Age: 59
End: 2019-10-22

## 2019-10-22 VITALS
WEIGHT: 168 LBS | RESPIRATION RATE: 18 BRPM | TEMPERATURE: 97.8 F | DIASTOLIC BLOOD PRESSURE: 65 MMHG | HEIGHT: 74 IN | HEART RATE: 82 BPM | OXYGEN SATURATION: 99 % | SYSTOLIC BLOOD PRESSURE: 104 MMHG | BODY MASS INDEX: 21.56 KG/M2

## 2019-10-22 DIAGNOSIS — B35.4 TINEA CORPORIS: Primary | ICD-10-CM

## 2019-10-22 DIAGNOSIS — F12.90 CANNABIS USE, UNCOMPLICATED: ICD-10-CM

## 2019-10-22 RX ORDER — CHLORPHENIRAMINE MALEATE 4 MG
TABLET ORAL 2 TIMES DAILY
Qty: 15 G | Refills: 0 | Status: SHIPPED | OUTPATIENT
Start: 2019-10-22

## 2019-10-22 NOTE — PATIENT INSTRUCTIONS
Office Policies    Phone calls/patient messages:            Please allow up to 24 hours for someone in the office to contact you about your call or message. Be mindful your provider may be out of the office or your message may require further review. We encourage you to use [employer]erd for your messages as this is a faster, more efficient way to communicate with our office                         Medication Refills:            Prescription medications require 48-72 business hours to process. We encourage you to use HourlyNerd for your refills. For controlled medications: Please allow 72 business hours to process. Certain medications may require you to  a written prescription at our office. NO narcotic/controlled medications will be prescribed after 4pm Monday through Friday or on weekends              Form/Paperwork Completion:            Please note a $25 fee may incur for all paperwork for completed by our providers. We ask that you allow 7-10 business days. Pre-payment is due prior to picking up/faxing the completed form. You may also download your forms to [employer] to have your doctor print off. Ringworm: Care Instructions  Your Care Instructions  Ringworm is a fungus infection of the skin. It is not caused by a worm. Ringworm causes a round, scaly rash that may crack and itch. The rash can spread over a wide area. One type of fungus that causes ringworm is often found in locker rooms and swimming pools. It grows well in warm, moist areas of the skin, such as in skin folds. You can get ringworm by sharing towels, clothing, and sports equipment. You can also get it by touching someone who has ringworm. Ringworm is treated with cream that kills the fungus. If the rash is widespread, you may need pills to get rid of it. Ringworm often comes back after treatment. If the rash becomes infected with bacteria, you may need antibiotics.   Follow-up care is a key part of your treatment and safety. Be sure to make and go to all appointments, and call your doctor if you are having problems. It's also a good idea to know your test results and keep a list of the medicines you take. How can you care for yourself at home? · Take your medicines exactly as prescribed. Call your doctor if you have any problems with your medicine. · Wash the rash with soap and water, remove flaky skin, and dry thoroughly. · Try an over-the-counter cream with clotrimazole or miconazole in it. Brand names include Lotrimin, Micatin, and Tinactin. Terbinafine cream (Lamisil) is also available without a prescription. Spread the cream beyond the edge or border of the rash. Follow the directions on the package. Do not stop using the medicine just because your skin clears up. You will probably need to continue treatment for 2 to 4 weeks. · To keep from getting another infection:  ? Do not go barefoot in public places such as gyms or locker rooms. Avoid sharing towels and clothes. Use flip-flops or some other type of shoe in the shower. ? Do not wear tight clothes or let your skin stay damp for long periods, such as by staying in a wet bathing suit or sweaty clothes. When should you call for help? Call your doctor now or seek immediate medical care if:    · You have signs of infection such as:  ? Pain, warmth, or swelling in your skin. ? Red streaks near a wound in the skin. ? Pus coming from the rash on your skin. ? A fever.    Watch closely for changes in your health, and be sure to contact your doctor if:    · Your ringworm does not improve after 2 weeks of treatment.     · You do not get better as expected. Where can you learn more? Go to http://hu-wild.info/. Enter L533 in the search box to learn more about \"Ringworm: Care Instructions. \"  Current as of: April 1, 2019  Content Version: 12.2  © 3479-3756 [employer].  Care instructions adapted under license by [employer] (which disclaims liability or warranty for this information). If you have questions about a medical condition or this instruction, always ask your healthcare professional. [address] any warranty or liability for your use of this information.

## 2019-10-22 NOTE — TELEPHONE ENCOUNTER
PCP: Stefano Nelson MD    Last appt: 7/19/2019  Future Appointments   Date Time Provider Ann-Marie Esquivel   10/22/2019  4:15 PM MD Billy Hanna 89       Requested Prescriptions     Pending Prescriptions Disp Refills    valACYclovir (VALTREX) 500 mg tablet 60 Tab 6     Sig: Take 1 Tab by mouth two (2) times a day.

## 2019-10-22 NOTE — PROGRESS NOTES
SUBJECTIVE  Mr. Sunshine Martini presents today acutely for     Chief Complaint   Patient presents with    Rash     He noticed a rash yesterday. Mild itching, but not intense. He is taking a CBC product mixed with honey. He has read and is now worried, that there could be a positive test for THC. He'd like to get a test for this. OBJECTIVE  Visit Vitals  /65 (BP 1 Location: Right arm, BP Patient Position: Sitting)   Pulse 82   Temp 97.8 °F (36.6 °C) (Oral)   Resp 18   Ht 6' 2\" (1.88 m)   Wt 168 lb (76.2 kg)   SpO2 99%   BMI 21.57 kg/m²     Gen: Pleasant 64 y.o.  male in NAD.     SKIN: Warm. Dry. He has an annular scaly rash on L distal shin. ASSESSMENT / PLAN    ICD-10-CM ICD-9-CM    1. Tinea corporis B35.4 110.5 clotrimazole (LOTRIMIN) 1 % topical cream       I have reviewed with the patient details of the assessment and plan and all questions were answered. Relevant patient education was performed. Follow-up and Dispositions    · Return if symptoms worsen or fail to improve.

## 2019-10-22 NOTE — PROGRESS NOTES
Reviewed record in preparation for visit and have obtained necessary documentation. Identified pt with two pt identifiers(name and ). Chief Complaint   Patient presents with    Rash       Health Maintenance Due   Topic Date Due    DTaP/Tdap/Td  (1 - Tdap) 1983    Colonoscopy  2017    Shingles Vaccine (2 of 2) 2019    Flu Vaccine  2019       Mr. Cameron Mojica has a reminder for a \"due or due soon\" health maintenance. I have asked that he discuss this further with his primary care provider for follow-up on this health maintenance. Coordination of Care Questionnaire:  :     1) Have you been to an emergency room, urgent care clinic since your last visit? no   Hospitalized since your last visit? no             2) Have you seen or consulted any other health care providers outside of [address] [place] [address] since your last visit? no  (Include any pap smears or colon screenings in this section.)    3) In the event something were to happen to you and you were unable to speak on your behalf, do you have an Advance Directive/ Living Will in place stating your wishes? NO    Do you have an Advance Directive on file? no    4) Are you interested in receiving information on Advance Directives? NO    Patient is accompanied by self I have received verbal consent from Tammy Molina to discuss any/all medical information while they are present in the room.

## 2019-10-23 RX ORDER — VALACYCLOVIR HYDROCHLORIDE 500 MG/1
500 TABLET, FILM COATED ORAL 2 TIMES DAILY
Qty: 60 TAB | Refills: 6 | Status: SHIPPED | OUTPATIENT
Start: 2019-10-23 | End: 2020-09-28 | Stop reason: SDUPTHER

## 2019-10-26 LAB
AMPHETAMINES UR QL SCN: NEGATIVE NG/ML
BARBITURATES UR QL SCN: NEGATIVE NG/ML
BENZODIAZ UR QL: NEGATIVE NG/ML
BZE UR QL: NEGATIVE NG/ML
CANNABINOIDS UR QL SCN: NEGATIVE NG/ML
CHOLEST SERPL-MCNC: 201 MG/DL (ref 100–199)
HDLC SERPL-MCNC: 62 MG/DL
LDLC SERPL CALC-MCNC: 126 MG/DL (ref 0–99)
MDMA UR QL SCN: NEGATIVE NG/ML
METHADONE UR QL SCN: NEGATIVE NG/ML
METHAQUALONE UR QL: NEGATIVE NG/ML
OPIATES UR QL: NEGATIVE NG/ML
PCP UR QL: NEGATIVE NG/ML
PROPOXYPH UR QL SCN: NEGATIVE NG/ML
TRIGL SERPL-MCNC: 64 MG/DL (ref 0–149)
VLDLC SERPL CALC-MCNC: 13 MG/DL (ref 5–40)

## 2020-05-15 ENCOUNTER — VIRTUAL VISIT (OUTPATIENT)
Dept: INTERNAL MEDICINE CLINIC | Age: 60
End: 2020-05-15

## 2020-05-15 DIAGNOSIS — L30.9 DERMATITIS: Primary | ICD-10-CM

## 2020-05-15 RX ORDER — METHYLPREDNISOLONE 4 MG/1
TABLET ORAL
Qty: 1 DOSE PACK | Refills: 0 | Status: SHIPPED | OUTPATIENT
Start: 2020-05-15 | End: 2020-10-20 | Stop reason: SDUPTHER

## 2020-05-15 NOTE — PROGRESS NOTES
Identified pt with two pt identifiers(name and ). Reviewed record in preparation for visit and have obtained necessary documentation. Chief Complaint   Patient presents with    Skin Problem     Pt has a spotty rash on legs, arms, and left side that is itchy. There were no vitals taken for this visit. Health Maintenance Due   Topic    DTaP/Tdap/Td series (1 - Tdap)    Colonoscopy        Med Reconciliation: Completed    Coordination of Care Questionnaire:  :   1) Have you been to an emergency room, urgent care, or hospitalized since your last visit? If yes, where when, and reason for visit? No       2. Have seen or consulted any other health care provider since your last visit? If yes, where when, and reason for visit? No       3) Do you have an Advanced Directive/ Living Will in place? No  If yes, do we have a copy on file   If no, would you like information       This is an established visit conducted via telemedicine. The patient has been instructed that this meets HIPAA criteria and acknowledges and agrees to this method of visitation.     Irving Brandon  05/15/20  8:13 AM

## 2020-05-15 NOTE — PROGRESS NOTES
Ale Gil is a 64 y.o. male who was seen by synchronous (real-time) audio-video technology on 5/15/2020. Consent: Ale Gil, who was seen by synchronous (real-time) audio-video technology, and/or his healthcare decision maker, is aware that this patient-initiated, Telehealth encounter on 5/15/2020 is a billable service, with coverage as determined by his insurance carrier. He is aware that he may receive a bill and has provided verbal consent to proceed: Yes. Assessment & Plan:   Diagnoses and all orders for this visit:    1. Dermatitis  -     methylPREDNISolone (MEDROL DOSEPACK) 4 mg tablet; Take as directed. This patient will continue to use triamcinolone. He has Lotrimin at home use for prior fungal issue. I advised him to try a small amount on the area to see if there is any resolution of the lesion. I am also sending a prescription for Medrol try and resolve the spread of the lesion. He was advised to follow-up with his dermatologist Dr. Gogo Leach. He agrees to reach out to her office and set up an appointment. 712  Subjective:   Ale Gil is a 64 y.o. male who was seen for Skin Problem (Pt has a spotty rash on legs, arms, and left side that is itchy. )       Patient reports developing red blotches on his leg a year ago. He describes it as flat red scaly. I was able to visualize the lesion during this encounter. It was red and red and round. Size of a nickel. He has been using triamcinolone she last saw Dr. Gogo Leach. He reports during his visit they were discussing any other issue and she did not see this until the very end. He reports it itches and the triamcinolone helps with the itching but it does not seem to resolve the lesion. He reports it becomes more red after a shower. He has other lesions now on his other leg and the back of his arm. He has not tried any new supplements or medications.   He denies any other associated symptoms with this rash  Prior to Admission medications    Medication Sig Start Date End Date Taking? Authorizing Provider   methylPREDNISolone (MEDROL DOSEPACK) 4 mg tablet Take as directed. 5/15/20  Yes Daniela Paul MD   coenzyme q10 10 mg cap Take  by mouth daily. Yes Provider, Historical   aspirin delayed-release 81 mg tablet Take 1 Tab by mouth daily. 5/18/18  Yes Antonio Díaz NP   valACYclovir (VALTREX) 500 mg tablet Take 1 Tab by mouth two (2) times a day. 10/23/19   Daniela Paul MD   clotrimazole (LOTRIMIN) 1 % topical cream Apply  to affected area two (2) times a day. 10/22/19   Sona Goins MD   triamcinolone acetonide (KENALOG) 0.1 % topical cream Apply  to affected area four (4) times daily as needed for Skin Irritation. use thin layer 7/19/19   Daniela Paul MD   fluticasone [facility]) 50 mcg/actuation nasal spray 2 Sprays by Both Nostrils route daily. 1/28/19   Ovidio Shelton MD   omega 9-nev-mar-fish oil (FISH OIL) 100-160-1,000 mg cap Take  by mouth two (2) times a day. Provider, Historical     Allergies   Allergen Reactions    Pollen Extracts Runny Nose           Review of Systems   Constitutional: Negative. HENT: Negative. Eyes: Negative. Respiratory: Negative. Cardiovascular: Negative. Gastrointestinal: Negative. Musculoskeletal: Negative. Skin: Positive for rash. Neurological: Negative. Objective: There were no vitals taken for this visit. General: alert, cooperative, no distress   Mental  status: normal mood, behavior, speech, dress, motor activity, and thought processes, able to follow commands   HENT: NCAT   Neck: no visualized mass   Resp: no respiratory distress   Neuro: no gross deficits   Skin: A few macular, erythematous ,round patches diffusely red on leg   Psychiatric: normal affect, consistent with stated mood, no evidence of hallucinations     Additional exam findings:        We discussed the expected course, resolution and complications of the diagnosis(es) in detail. Medication risks, benefits, costs, interactions, and alternatives were discussed as indicated. I advised him to contact the office if his condition worsens, changes or fails to improve as anticipated. He expressed understanding with the diagnosis(es) and plan. Jeanmarie Mckenzie is a 64 y.o. male who was evaluated by a video visit encounter for concerns as above. Patient identification was verified prior to start of the visit. A caregiver was present when appropriate. Due to this being a TeleHealth encounter (During [address] emergency), evaluation of the following organ systems was limited: Vitals/Constitutional/EENT/Resp/CV/GI//MS/Neuro/Skin/Heme-Lymph-Imm. Pursuant to the emergency declaration under the [facility] waiver authority and the "[employer] and [employer] Act, this Virtual  Visit was conducted, with patient's (and/or legal guardian's) consent, to reduce the patient's risk of exposure to COVID-19 and provide necessary medical care. Services were provided through a video synchronous discussion virtually to substitute for in-person clinic visit. Patient and provider were located at their individual homes.       Abilio Petersen MD

## 2020-09-28 ENCOUNTER — NURSE TRIAGE (OUTPATIENT)
Dept: OTHER | Facility: CLINIC | Age: 60
End: 2020-09-28

## 2020-09-28 RX ORDER — VALACYCLOVIR HYDROCHLORIDE 500 MG/1
500 TABLET, FILM COATED ORAL 2 TIMES DAILY
Qty: 60 TAB | Refills: 6 | Status: CANCELLED | OUTPATIENT
Start: 2020-09-28

## 2020-09-28 NOTE — TELEPHONE ENCOUNTER
Future Appointments:  Future Appointments   Date Time Provider Ann-Marie Starri   9/29/2020  1:30 PM Miky Crow MD [facility]        Last Appointment With Me:  5/15/2020     Requested Prescriptions     Pending Prescriptions Disp Refills    valACYclovir (VALTREX) 500 mg tablet 60 Tab 6     Sig: Take 1 Tab by mouth two (2) times a day.

## 2020-09-28 NOTE — TELEPHONE ENCOUNTER
Reason for Disposition   Patient wants to be seen    Answer Assessment - Initial Assessment Questions  1. ONSET: \"When did the cough begin? \"       Cough started a couple of weeks ago  2. SEVERITY: \"How bad is the cough today? \"     Coughing up phlegm, cough worse in the morning and evening  3. RESPIRATORY DISTRESS: \"Describe your breathing. \"      No breathing concerns  4. FEVER: \"Do you have a fever? \" If so, ask: \"What is your temperature, how was it measured, and when did it start? \"  No fever  5. HEMOPTYSIS: \"Are you coughing up any blood? \" If so ask: \"How much? \" (flecks, streaks, tablespoons, etc.)    6. TREATMENT: \"What have you done so far to treat the cough? \" (e.g., meds, fluids, humidifier)  Has taken zyrtec  7. CARDIAC HISTORY: \"Do you have any history of heart disease? \" (e.g., heart attack, congestive heart failure)      Open heart surgery when 8years old  6. LUNG HISTORY: \"Do you have any history of lung disease? \"  (e.g., pulmonary embolus, asthma, emphysema)    no  9. PE RISK FACTORS: \"Do you have a history of blood clots? \" (or: recent major surgery, recent prolonged travel, bedridden)   no  10. OTHER SYMPTOMS: \"Do you have any other symptoms? (e.g., runny nose, wheezing, chest pain)  Congestion more in chest   11. PREGNANCY: \"Is there any chance you are pregnant? \" \"When was your last menstrual period? \"      na  12. TRAVEL: \"Have you traveled out of the country in the last month? \" (e.g., travel history, exposures)    Protocols used: [id number]    Patient called pre-service center [facility]) to schedule appointment, with red flag complaint, transferred to RN access for triage. Pt calling with cough for about 2 weeks. Has a lot of chest congestion. Coughing up phlegm. No fever, no breathing concerns. Recommend pt is seen today, call sooner if worsens. Call soft transferred to [employer] to schedule appt.

## 2020-09-28 NOTE — TELEPHONE ENCOUNTER
Called and spoke to patient . Patient states that he is 3 hours away and can not go to [address] [facility] [facility] . He has a virtual visit with Dr Radha Bonner on 9/29 and will keep that visit. No fever or shortness of breath. Informed patient if he gets worse he will need to got to the ER. Patient verbalized he understood.

## 2020-09-28 NOTE — TELEPHONE ENCOUNTER
----- Message from Piyush Garcia sent at 9/28/2020 10:19 AM EDT -----  Regarding: Dr. Rexine Cogan  Medication Refill    Caller (if not patient):      Relationship of caller (if not patient):      Best contact number(s):[phone]      Name of medication and dosage if known:\"Valacyclovir\"      Is patient out of this medication (yes/no):yes      [facility] listed in chart? (yes/no):yes  Pharmacy phone number:      Details to clarify the request:Pt requested  a refill on his Rx and stated he is out of medication.       Piyush Garcia

## 2020-09-28 NOTE — TELEPHONE ENCOUNTER
[place], [facility] Office Pool               Medication Refill     Caller (if not patient):       Relationship of caller (if not patient):       Best contact number(s):[phone]       Name of medication and dosage if known:\"Valacyclovir\"       Is patient out of this medication (yes/no):yes       [facility] listed in chart? (yes/no):yes   Pharmacy phone number:       Details to clarify the request:Pt requested  a refill on his Rx and stated he is out of medication.        Shraddha Mcghee

## 2020-09-29 ENCOUNTER — VIRTUAL VISIT (OUTPATIENT)
Dept: INTERNAL MEDICINE CLINIC | Age: 60
End: 2020-09-29
Payer: COMMERCIAL

## 2020-09-29 DIAGNOSIS — J20.9 ACUTE BRONCHITIS, UNSPECIFIED ORGANISM: Primary | ICD-10-CM

## 2020-09-29 PROCEDURE — 99214 OFFICE O/P EST MOD 30 MIN: CPT | Performed by: INTERNAL MEDICINE

## 2020-09-29 RX ORDER — AZITHROMYCIN 250 MG/1
250 TABLET, FILM COATED ORAL SEE ADMIN INSTRUCTIONS
Qty: 6 TAB | Refills: 0 | Status: SHIPPED | OUTPATIENT
Start: 2020-09-29 | End: 2020-10-04

## 2020-09-29 RX ORDER — VALACYCLOVIR HYDROCHLORIDE 500 MG/1
500 TABLET, FILM COATED ORAL 2 TIMES DAILY
Qty: 60 TAB | Refills: 6 | Status: SHIPPED | OUTPATIENT
Start: 2020-09-29

## 2020-09-29 NOTE — PROGRESS NOTES
Cat Hinojosa is a 64 y.o. male who was seen by synchronous (real-time) audio-video technology on 9/29/2020 for URI (pt c/o cough congestion x 2 weeks )        Progress Note     SUBJECTIVE  Mr. Cat Hinojosa presents today acutely for     Chief Complaint   Patient presents with    URI     pt c/o cough congestion x 2 weeks      \"This isn't like cold or flu, just chest congestion. I get this a couple times a year. \"  Coughing up brown phlegm. +Runny nose, sneezing--initially, but that has subsided. No F/C. OBJECTIVE  There were no vitals taken for this visit. Gen: Pleasant 64 y.o.  male in NAD.        ASSESSMENT / PLAN    ICD-10-CM ICD-9-CM    1. Acute bronchitis, unspecified organism  J20.9 466.0 azithromycin (ZITHROMAX) 250 mg tablet     Using OTC cough medicine. Has some tessalon also. Follow-up and Dispositions    · Return if symptoms worsen or fail to improve. Objective:   No flowsheet data found.      [INSTRUCTIONS:  \"[x]\" Indicates a positive item  \"[]\" Indicates a negative item  -- DELETE ALL ITEMS NOT EXAMINED]    Constitutional: [x] Appears well-developed and well-nourished [x] No apparent distress      [] Abnormal -     Mental status: [x] Alert and awake  [x] Oriented to person/place/time [x] Able to follow commands    [] Abnormal -     Eyes:   EOM    [x]  Normal    [] Abnormal -   Sclera  [x]  Normal    [] Abnormal -          Discharge [x]  None visible   [] Abnormal -     HENT: [x] Normocephalic, atraumatic  [] Abnormal -   [x] Mouth/Throat: Mucous membranes are moist    External Ears [x] Normal  [] Abnormal -    Neck: [x] No visualized mass [] Abnormal -     Pulmonary/Chest: [x] Respiratory effort normal   [x] No visualized signs of difficulty breathing or respiratory distress        [] Abnormal -      Musculoskeletal:   [x] Normal gait with no signs of ataxia         [x] Normal range of motion of neck        [] Abnormal -     Neurological:        [x] No Facial Asymmetry (Cranial nerve 7 motor function) (limited exam due to video visit)          [x] No gaze palsy        [] Abnormal -          Skin:        [x] No significant exanthematous lesions or discoloration noted on facial skin         [] Abnormal -            Psychiatric:       [x] Normal Affect [] Abnormal -       Other pertinent observable physical exam findings:-        We discussed the expected course, resolution and complications of the diagnosis(es) in detail. Medication risks, benefits, costs, interactions, and alternatives were discussed as indicated. I advised him to contact the office if his condition worsens, changes or fails to improve as anticipated. He expressed understanding with the diagnosis(es) and plan. Gurinder Kaiser, who was evaluated through a patient-initiated, synchronous (real-time) audio-video encounter, and/or his healthcare decision maker, is aware that it is a billable service, with coverage as determined by his insurance carrier. He provided verbal consent to proceed: YES, and patient identification was verified. It was conducted pursuant to the emergency declaration under the [address] [place] [address] authority and the [employer] Act. A caregiver was present when appropriate. Ability to conduct physical exam was limited. I was in office. The patient was at home or otherwise outside the office.       Wesley Obregon MD

## 2020-09-29 NOTE — PATIENT INSTRUCTIONS
Office Policies Phone calls/patient messages: Please allow up to 24 hours for someone in the office to contact you about your call or message. Be mindful your provider may be out of the office or your message may require further review. We encourage you to use [employer] for your messages as this is a faster, more efficient way to communicate with our office Medication Refills: 
         
Prescription medications require 48-72 business hours to process. We encourage you to use Satellier for your refills. For controlled medications: Please allow 72 business hours to process. Certain medications may require you to  a written prescription at our office. NO narcotic/controlled medications will be prescribed after 4pm Monday through Friday or on weekends Form/Paperwork Completion: 
         
Please note a $25 fee may incur for all paperwork for completed by our providers. We ask that you allow 7-10 business days. Pre-payment is due prior to picking up/faxing the completed form. You may also download your forms to [employer]ellier to have your doctor print off. 
 
 
1. Have you been to the ER, urgent care clinic since your last visit? Hospitalized since your last visit? no 
 
2. Have you seen or consulted any other health care providers outside of the [address] [place] [address] since your last visit? Include any pap smears or colon screening.  no

## 2020-10-20 ENCOUNTER — TELEPHONE (OUTPATIENT)
Dept: INTERNAL MEDICINE CLINIC | Age: 60
End: 2020-10-20

## 2020-10-20 DIAGNOSIS — L30.9 DERMATITIS: ICD-10-CM

## 2020-10-20 NOTE — TELEPHONE ENCOUNTER
Patient was treated by Dr. Rebecca Davila for Bronchitis . Patient is still coughing, ABT helped , patient wants to know if he could get ABT to help clear up. Last visit was 9/29//20.

## 2020-10-20 NOTE — TELEPHONE ENCOUNTER
Jenny Elliott. [facility] Office Pool                 Caller's first and last name: self   Reason for call: antibiotic   Callback required yes/no and why: yes   Best contact number(s): [phone]   Details to clarify the request: Pt saw Dr. Anand Gamez for Bronchitis, however the antibiotic didn't cure it. Pt was advised to call back to proceed to another form of treatment.  [facility] on file

## 2020-10-21 NOTE — TELEPHONE ENCOUNTER
Future Appointments:  No future appointments. Last Appointment With Me:  5/15/2020     Requested Prescriptions     Pending Prescriptions Disp Refills    methylPREDNISolone (MEDROL DOSEPACK) 4 mg tablet 1 Dose Pack 0     Sig: Take as directed.

## 2020-10-22 RX ORDER — METHYLPREDNISOLONE 4 MG/1
TABLET ORAL
Qty: 1 DOSE PACK | Refills: 0 | Status: SHIPPED | OUTPATIENT
Start: 2020-10-22 | End: 2020-10-23 | Stop reason: ALTCHOICE

## 2020-10-23 ENCOUNTER — TELEPHONE (OUTPATIENT)
Dept: INTERNAL MEDICINE CLINIC | Age: 60
End: 2020-10-23

## 2020-10-23 ENCOUNTER — VIRTUAL VISIT (OUTPATIENT)
Dept: INTERNAL MEDICINE CLINIC | Age: 60
End: 2020-10-23

## 2020-10-23 DIAGNOSIS — R05.9 COUGH IN ADULT: Primary | ICD-10-CM

## 2020-10-23 NOTE — TELEPHONE ENCOUNTER
Per. Dr. Daysi Buck, chest x-ray order originated per verbal read back. Identified patient 2 identifiers verified. Patient was emailed  Chest -ray order to have chest x-ray done today. Dr. Daysi Buck will contact patient after x-ray orders received.

## 2020-12-28 ENCOUNTER — NURSE TRIAGE (OUTPATIENT)
Dept: OTHER | Facility: CLINIC | Age: 60
End: 2020-12-28

## 2020-12-28 NOTE — TELEPHONE ENCOUNTER
Received call from  at [address] [place] [address]. Call Soft transferred to Juliane Toribio in [place] to assist in scheduling appointment. Attention Provider: Thank you for allowing me to participate in the care of your patient. The  patient was connected to triage in response to information provided to the [facility]. Please do not respond through this encounter as the response is not directed to a shared pool. Reason for Disposition   Patient wants to be seen    Answer Assessment - Initial Assessment Questions  1. COVID-19 DIAGNOSIS: \"Who made your Coronavirus (COVID-19) diagnosis? \" \"Was it confirmed by a positive lab test?\" If not diagnosed by a HCP, ask \"Are there lots of cases (community spread) where you live? \" (See public health department website, if unsure)      Tested + 12/16/20    2. COVID-19 EXPOSURE: \"Was there any known exposure to COVID before the symptoms began? \" [facility] Definition of close contact: within 6 feet (2 meters) for a total of 15 minutes or more over a 24-hour period. He did have exposure before tested. .     3. ONSET: \"When did the COVID-19 symptoms start?\"       12/12/20    4. WORST SYMPTOM: \"What is your worst symptom? \" (e.g., cough, fever, shortness of breath, muscle aches)      Dizzy    5. COUGH: \"Do you have a cough? \" If so, ask: \"How bad is the cough? \"        Denies    6. FEVER: \"Do you have a fever? \" If so, ask: \"What is your temperature, how was it measured, and when did it start? \"      Denies    7. RESPIRATORY STATUS: \"Describe your breathing? \" (e.g., shortness of breath, wheezing, unable to speak)       Deric SOB, hx a fib    8. BETTER-SAME-WORSE: Crystal Edin you getting better, staying the same or getting worse compared to yesterday? \"  If getting worse, ask, \"In what way? \"      Same    9. HIGH RISK DISEASE: \"Do you have any chronic medical problems? \" (e.g., asthma, heart or lung disease, weak immune system, obesity, etc.)      A fib    10.  PREGNANCY: \"Is there any chance you are pregnant? \" \"When was your last menstrual period? \"        N/a    11. OTHER SYMPTOMS: \"Do you have any other symptoms? \"  (e.g., chills, fatigue, headache, loss of smell or taste, muscle pain, sore throat; new loss of smell or taste especially support the diagnosis of COVID-19)        SOB, headache, weak, fatigue, dizzy, light headed    Answer Assessment - Initial Assessment Questions  1. DESCRIPTION: \"Please describe your heart rate or heart beat that you are having\" (e.g., fast/slow, regular/irregular, skipped or extra beats, \"palpitations\")      It occasionally skips beats, the blood pressure cuff caught it missing two beats in one session and 3 beats in another blood pressure measuring session. 2. ONSET: \"When did it start? \" (Minutes, hours or days)       12/12/20, hx afib, diagnosed with covid 12/16/20    3. DURATION: \"How long does it last\" (e.g., seconds, minutes, hours)      Short periods    4. PATTERN \"Does it come and go, or has it been constant since it started? \"  \"Does it get worse with exertion? \"   \"Are you feeling it now? \"      Comes and goes. Not present now. Not dizzy or SOB now, but fatigued. 5. TAP: \"Using your hand, can you tap out what you are feeling on a chair or table in front of you, so that I can hear? \" (Note: not all patients can do this)        Has pulse ox, rate 91, not irregular at this time. 6. HEART RATE: \"Can you tell me your heart rate? \" \"How many beats in 15 seconds? \"  (Note: not all patients can do this)        91    7. RECURRENT SYMPTOM: \"Have you ever had this before? \" If so, ask: \"When was the last time? \" and \"What happened that time? \"       Hx a fib    8. CAUSE: \"What do you think is causing the palpitations? \"      Unsure    9. CARDIAC HISTORY: \"Do you have any history of heart disease? \" (e.g., heart attack, angina, bypass surgery, angioplasty, arrhythmia)       afib    10. OTHER SYMPTOMS: \"Do you have any other symptoms? \" (e.g., dizziness, chest pain, sweating, difficulty breathing)        Dizzy,, SOB, fatigue    11. PREGNANCY: \"Is there any chance you are pregnant? \" \"When was your last menstrual period? \"        N/a    Protocols used: HEART RATE AND HEARTBEAT QUESTIONS-ADULT-OH, CORONAVIRUS (COVID-19) DIAGNOSED OR SUSPECTED-ADULT-AH

## 2021-01-06 ENCOUNTER — VIRTUAL VISIT (OUTPATIENT)
Dept: INTERNAL MEDICINE CLINIC | Age: 61
End: 2021-01-06
Payer: COMMERCIAL

## 2021-01-06 DIAGNOSIS — R00.2 PALPITATIONS: ICD-10-CM

## 2021-01-06 DIAGNOSIS — Z86.16 PERSONAL HISTORY OF COVID-19: Primary | ICD-10-CM

## 2021-01-06 DIAGNOSIS — I48.0 PAF (PAROXYSMAL ATRIAL FIBRILLATION) (HCC): ICD-10-CM

## 2021-01-06 DIAGNOSIS — R06.02 SOB (SHORTNESS OF BREATH) ON EXERTION: ICD-10-CM

## 2021-01-06 DIAGNOSIS — E78.2 MIXED HYPERLIPIDEMIA: ICD-10-CM

## 2021-01-06 DIAGNOSIS — Z12.5 PROSTATE CANCER SCREENING: ICD-10-CM

## 2021-01-06 PROCEDURE — 99214 OFFICE O/P EST MOD 30 MIN: CPT | Performed by: FAMILY MEDICINE

## 2021-01-06 NOTE — PROGRESS NOTES
Gaurang Amador is a 64 y.o. male who was seen by synchronous (real-time) audio-video technology on 1/6/2021 for Irregular Heart Beat and Concern For COVID-19 (Coronavirus)    Assessment & Plan:   Diagnoses and all orders for this visit:    1. Personal history of covid-19  -     REFERRAL TO PULMONARY DISEASE  -     PULMONARY FUNCTION TEST; Future  -     XR CHEST PA LAT; Future  -     METABOLIC PANEL, COMPREHENSIVE  -     CBC WITH AUTOMATED DIFF    2. Palpitations    3. SOB (shortness of breath) on exertion  -     REFERRAL TO PULMONARY DISEASE  -     PULMONARY FUNCTION TEST; Future  -     XR CHEST PA LAT; Future    4. Mixed hyperlipidemia  -     LIPID PANEL  -     METABOLIC PANEL, COMPREHENSIVE    5. PAF (paroxysmal atrial fibrillation) ([facility] [address]    6. Prostate cancer screening  -     PSA SCREENING (SCREENING)    1. Personal History of COVID-19  I referred pt to Dr. Tiffanie Christina (pulmonary) for further evaluation and recommended a chest XR and pulmonary function test for the future. Recheck CMP and CBC. I advised pt to begin taking an OTC Zinc supplement, Vitamin C, and Vitamin D. I recommended the pt to walk to build up endurance. 2. Palpitations  I advised pt to f/u with Dr. Nica Stone (cardiology) for further evaluation. 3. SOB on Exertion  I referred pt to Dr. Tiffanie Christina (pulmonary) for further evaluation and recommended a chest XR and pulmonary function test for the future. 4. Hyperlipidemia   Recheck lipid panel and CMP. 5. PAF  I advised pt to f/u with Dr. Nica Stone (cardiology) for further evaluation. 6. Prostate cancer screening  Will schedule a PSA test.    We will f/u in four weeks to check on his recovery progress. Subjective:     Patient presents virtually today for an acute visit c/o an irregular heart beat and COVID-19 concerns. COVID-19: Pt reports feeling better today. He notes a HA for the past 1.5 weeks with improved severity with a specifically intense HA on 12/28/2020.  Pt also notes that he felt dizzy and experienced sxs similar to Afib. He bought a pulse oximeter and BP cuff, which he notes showed an irregular heartbeat and that he started feeling fatigued due to the irregular heartbeat. He states that he tested positive for COVID-19. After 12/25/2020 he reports extreme body aches. He states that the irregular heartbeat has improved slightly and that his SOB only occurs with exertion. He denies taking Vitamin C or Vitamin D but has had a few Emergen-C packs. He notes stopping caffeine and alcohol to try and improve his headaches but did not notice any improvement or exacerbation. He follows with Dr. Raymond Spring (cardiology). His last PSA test was in 2018 and showed nl results. He will get blood work in the future. 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 He reports the fatigue has affected the ability to perform his job. Prior to Admission medications    Medication Sig Start Date End Date Taking? Authorizing Provider   valACYclovir (VALTREX) 500 mg tablet Take 1 Tab by mouth two (2) times a day. 9/29/20  Yes Marzena Negrete MD   clotrimazole (LOTRIMIN) 1 % topical cream Apply  to affected area two (2) times a day. 10/22/19  Yes Collis Meckel, MD   triamcinolone acetonide (KENALOG) 0.1 % topical cream Apply  to affected area four (4) times daily as needed for Skin Irritation. use thin layer 7/19/19  Yes Marzena Negrete MD   fluticasone [facility]) 50 mcg/actuation nasal spray 2 Sprays by Both Nostrils route daily. 1/28/19  Yes Sheryl Fulton MD   omega 4-ahd-lub-fish oil (FISH OIL) 100-160-1,000 mg cap Take  by mouth two (2) times a day. Yes Provider, Historical   coenzyme q10 10 mg cap Take  by mouth daily. Yes Provider, Historical   aspirin delayed-release 81 mg tablet Take 1 Tab by mouth daily.  5/18/18  Yes rEicka Dill NP     Patient Active Problem List    Diagnosis Date Noted    PAF (paroxysmal atrial fibrillation) (HCC)     Cerebellar stroke syndrome 06/28/2012    Dizziness 06/19/2012    Persistent vomiting 06/19/2012    Aorta coarctation repair age 8 06/30/2011    Migraine 06/01/2011    Injury of right foot 03/31/2011    Acute bronchitis 01/28/2011    Hyperlipidemia 06/24/2010    Nephrolithiasis 05/26/2010     Current Outpatient Medications   Medication Sig Dispense Refill    valACYclovir (VALTREX) 500 mg tablet Take 1 Tab by mouth two (2) times a day. 60 Tab 6    clotrimazole (LOTRIMIN) 1 % topical cream Apply  to affected area two (2) times a day. 15 g 0    triamcinolone acetonide (KENALOG) 0.1 % topical cream Apply  to affected area four (4) times daily as needed for Skin Irritation. use thin layer 45 g 0    fluticasone (FLONASE) 50 mcg/actuation nasal spray 2 Sprays by Both Nostrils route daily. 1 Bottle 0    omega 3-dha-epa-fish oil (FISH OIL) 100-160-1,000 mg cap Take  by mouth two (2) times a day.  coenzyme q10 10 mg cap Take  by mouth daily.  aspirin delayed-release 81 mg tablet Take 1 Tab by mouth daily.        Allergies   Allergen Reactions    Pollen Extracts Runny Nose     Past Medical History:   Diagnosis Date    Calculus of kidney     Heart failure ([facility] [address]     Hyperlipidemia 6/24/2010    Hyperlipidemia 6/24/2010    Migraine 6/1/2011    Nephrolithiasis 5/26/2010    Other ill-defined conditions(799.89)     etoh abuse    PAF (paroxysmal atrial fibrillation) (HCC)     Proteinuria 5/26/2010    Psychiatric disorder     depression     Past Surgical History:   Procedure Laterality Date    CARDIAC SURG PROCEDURE UNLIST      sx for coarctaion of aorta    HX ORTHOPAEDIC      shoulder sx     Family History   Problem Relation Age of Onset    Bleeding Prob Mother     Parkinsonism Father     Heart Disease Father 62        MI x two    Heart Attack Father      Social History     Tobacco Use    Smoking status: Never Smoker    Smokeless tobacco: Never Used   Substance Use Topics    Alcohol use: Yes     Alcohol/week: 14.0 standard drinks     Types: 14 Cans of beer per week       Review of Systems   Constitutional: Positive for malaise/fatigue. HENT: Negative. Respiratory: Negative for shortness of breath. Cardiovascular: Positive for palpitations (irregular beat). Negative for chest pain. Gastrointestinal: Negative. Genitourinary: Negative. Musculoskeletal: Negative. Neurological: Negative. Psychiatric/Behavioral: Negative. Objective:   No flowsheet data found.      [INSTRUCTIONS:  \"[x]\" Indicates a positive item  \"[]\" Indicates a negative item  -- DELETE ALL ITEMS NOT EXAMINED]    Constitutional: [x] Appears well-developed and well-nourished [x] No apparent distress      [] Abnormal -     Mental status: [x] Alert and awake  [x] Oriented to person/place/time [x] Able to follow commands    [] Abnormal -     Eyes:   EOM    [x]  Normal    [] Abnormal -   Sclera  [x]  Normal    [] Abnormal -          Discharge [x]  None visible   [] Abnormal -     HENT: [x] Normocephalic, atraumatic  [] Abnormal -   [x] Mouth/Throat: Mucous membranes are moist    External Ears [x] Normal  [] Abnormal -    Neck: [x] No visualized mass [] Abnormal -     Pulmonary/Chest: [x] Respiratory effort normal   [x] No visualized signs of difficulty breathing or respiratory distress        [] Abnormal -      Musculoskeletal:   [x] Normal gait with no signs of ataxia         [x] Normal range of motion of neck        [] Abnormal -     Neurological:        [x] No Facial Asymmetry (Cranial nerve 7 motor function) (limited exam due to video visit)          [x] No gaze palsy        [] Abnormal -          Skin:        [x] No significant exanthematous lesions or discoloration noted on facial skin         [] Abnormal -            Psychiatric:       [x] Normal Affect [] Abnormal -        [x] No Hallucinations    Other pertinent observable physical exam findings:-        We discussed the expected course, resolution and complications of the diagnosis(es) in detail. Medication risks, benefits, costs, interactions, and alternatives were discussed as indicated. I advised him to contact the office if his condition worsens, changes or fails to improve as anticipated. He expressed understanding with the diagnosis(es) and plan. Aakash Helton, who was evaluated through a patient-initiated, synchronous (real-time) audio-video encounter, and/or his healthcare decision maker, is aware that it is a billable service, with coverage as determined by his insurance carrier. He provided verbal consent to proceed: Yes, and patient identification was verified. It was conducted pursuant to the emergency declaration under the [address] [place] [address] authority and the for; to (do) and [employer] General Act. A caregiver was present when appropriate. Ability to conduct physical exam was limited. I was at home. The patient was at home.       Carl Strong, as dictated by Kasia Alexander MD.

## 2021-01-06 NOTE — PROGRESS NOTES
Reviewed record in preparation for visit and have obtained necessary documentation. Identified pt with two pt identifiers(name and ). Chief Complaint   Patient presents with    Irregular Heart Beat    Concern For COVID-19 (Coronavirus)       Health Maintenance Due   Topic Date Due    DTaP/Tdap/Td  (1 - Tdap) 1983    Colorectal Screening  2017    Yearly Flu Vaccine (1) 2020       Mr. Yogi Brannon has a reminder for a \"due or due soon\" health maintenance. I have asked that he discuss this further with his primary care provider for follow-up on this health maintenance. Coordination of Care Questionnaire:  :     1) Have you been to an emergency room, urgent care clinic since your last visit? no   Hospitalized since your last visit? no             2) Have you seen or consulted any other health care providers outside of [address] [place] [address] since your last visit? no  (Include any pap smears or colon screenings in this section.)    3) In the event something were to happen to you and you were unable to speak on your behalf, do you have an Advance Directive/ Living Will in place stating your wishes? NO    Do you have an Advance Directive on file? no    4) Are you interested in receiving information on Advance Directives? NO    Patient is accompanied by self I have received verbal consent from Cosme Mcfarland to discuss any/all medical information while they are present in the room.

## 2021-01-06 NOTE — Clinical Note
Please contact the patient with the scheduling number for PFT. He also needs help getting an appointment with cardiology for his afib asap. Please send labs to [address] [facility].

## 2021-01-07 ENCOUNTER — TELEPHONE (OUTPATIENT)
Dept: INTERNAL MEDICINE CLINIC | Age: 61
End: 2021-01-07

## 2021-01-07 NOTE — TELEPHONE ENCOUNTER
Chest xray order, lab slip, pulmonary func test/pulmonary order printed by Dr Dl Valdovinos on 1/6/21 has been mailed out to pt.

## 2021-01-09 ENCOUNTER — TELEPHONE (OUTPATIENT)
Dept: INTERNAL MEDICINE CLINIC | Age: 61
End: 2021-01-09

## 2021-01-09 NOTE — TELEPHONE ENCOUNTER
He does not have to see Dr. Obey Low for any cardiology follow-up. His any other cardiologist in the group available for [place]?

## 2021-01-12 ENCOUNTER — TRANSCRIBE ORDER (OUTPATIENT)
Dept: SCHEDULING | Age: 61
End: 2021-01-12

## 2021-01-12 ENCOUNTER — HOSPITAL ENCOUNTER (OUTPATIENT)
Dept: PULMONOLOGY | Age: 61
Discharge: HOME | End: 2021-01-12
Attending: FAMILY MEDICINE
Payer: COMMERCIAL

## 2021-01-12 ENCOUNTER — HOSPITAL ENCOUNTER (OUTPATIENT)
Dept: GENERAL RADIOLOGY | Age: 61
Discharge: HOME | End: 2021-01-12
Payer: COMMERCIAL

## 2021-01-12 DIAGNOSIS — R06.02 SOB (SHORTNESS OF BREATH) ON EXERTION: ICD-10-CM

## 2021-01-12 DIAGNOSIS — Z86.16 PERSONAL HISTORY OF COVID-19: ICD-10-CM

## 2021-01-12 DIAGNOSIS — Z86.16 PERSONAL HISTORY OF COVID-19: Primary | ICD-10-CM

## 2021-01-12 PROCEDURE — 71046 X-RAY EXAM CHEST 2 VIEWS: CPT

## 2021-01-12 PROCEDURE — 94375 RESPIRATORY FLOW VOLUME LOOP: CPT

## 2021-01-12 PROCEDURE — 94729 DIFFUSING CAPACITY: CPT

## 2021-01-12 PROCEDURE — 94726 PLETHYSMOGRAPHY LUNG VOLUMES: CPT

## 2021-01-13 ENCOUNTER — TELEPHONE (OUTPATIENT)
Dept: INTERNAL MEDICINE CLINIC | Age: 61
End: 2021-01-13

## 2021-01-13 DIAGNOSIS — R79.89 ABNORMAL LIVER FUNCTION TEST: Primary | ICD-10-CM

## 2021-01-13 LAB
ALBUMIN SERPL-MCNC: 4.1 G/DL (ref 3.8–4.9)
ALBUMIN/GLOB SERPL: 1.6 {RATIO} (ref 1.2–2.2)
ALP SERPL-CCNC: 155 IU/L (ref 39–117)
ALT SERPL-CCNC: 124 IU/L (ref 0–44)
AST SERPL-CCNC: 55 IU/L (ref 0–40)
BASOPHILS # BLD AUTO: 0.1 X10E3/UL (ref 0–0.2)
BASOPHILS NFR BLD AUTO: 1 %
BILIRUB SERPL-MCNC: 0.6 MG/DL (ref 0–1.2)
BUN SERPL-MCNC: 17 MG/DL (ref 6–24)
BUN/CREAT SERPL: 18 (ref 9–20)
CALCIUM SERPL-MCNC: 9.1 MG/DL (ref 8.7–10.2)
CHLORIDE SERPL-SCNC: 104 MMOL/L (ref 96–106)
CHOLEST SERPL-MCNC: 205 MG/DL (ref 100–199)
CO2 SERPL-SCNC: 24 MMOL/L (ref 20–29)
CREAT SERPL-MCNC: 0.94 MG/DL (ref 0.76–1.27)
EOSINOPHIL # BLD AUTO: 0.4 X10E3/UL (ref 0–0.4)
EOSINOPHIL NFR BLD AUTO: 6 %
ERYTHROCYTE [DISTWIDTH] IN BLOOD BY AUTOMATED COUNT: 12.8 % (ref 11.6–15.4)
GLOBULIN SER CALC-MCNC: 2.5 G/DL (ref 1.5–4.5)
GLUCOSE SERPL-MCNC: 86 MG/DL (ref 65–99)
HCT VFR BLD AUTO: 41.9 % (ref 37.5–51)
HDLC SERPL-MCNC: 48 MG/DL
HGB BLD-MCNC: 14 G/DL (ref 13–17.7)
IMM GRANULOCYTES # BLD AUTO: 0 X10E3/UL (ref 0–0.1)
IMM GRANULOCYTES NFR BLD AUTO: 0 %
LDLC SERPL CALC-MCNC: 142 MG/DL (ref 0–99)
LYMPHOCYTES # BLD AUTO: 1.8 X10E3/UL (ref 0.7–3.1)
LYMPHOCYTES NFR BLD AUTO: 30 %
MCH RBC QN AUTO: 30 PG (ref 26.6–33)
MCHC RBC AUTO-ENTMCNC: 33.4 G/DL (ref 31.5–35.7)
MCV RBC AUTO: 90 FL (ref 79–97)
MONOCYTES # BLD AUTO: 0.7 X10E3/UL (ref 0.1–0.9)
MONOCYTES NFR BLD AUTO: 11 %
NEUTROPHILS # BLD AUTO: 3.2 X10E3/UL (ref 1.4–7)
NEUTROPHILS NFR BLD AUTO: 52 %
PLATELET # BLD AUTO: 275 X10E3/UL (ref 150–450)
POTASSIUM SERPL-SCNC: 4.7 MMOL/L (ref 3.5–5.2)
PROT SERPL-MCNC: 6.6 G/DL (ref 6–8.5)
PSA SERPL-MCNC: 1.6 NG/ML (ref 0–4)
RBC # BLD AUTO: 4.67 X10E6/UL (ref 4.14–5.8)
SODIUM SERPL-SCNC: 139 MMOL/L (ref 134–144)
TRIGL SERPL-MCNC: 81 MG/DL (ref 0–149)
VLDLC SERPL CALC-MCNC: 15 MG/DL (ref 5–40)
WBC # BLD AUTO: 6.1 X10E3/UL (ref 3.4–10.8)

## 2021-01-14 ENCOUNTER — TRANSCRIBE ORDER (OUTPATIENT)
Dept: FAMILY MEDICINE CLINIC | Age: 61
End: 2021-01-14

## 2021-01-14 NOTE — PROGRESS NOTES
Lipids: Your current lab results reveal a elevated total cholesterol and ldl. Your total cholesterol should be under 200 and your ldl under 100. Work on following a low fat diet and exercise at least three times a week. CMP :Normal electrolyte levels except for an elevation in the glucose level, and normal renal  function. This test result also reveals a significant elevation in your liver enzymes. He will need an abdominal ultrasound.     Normal PSA and CBC    Chest x-ray negative for pneumonia

## 2021-01-14 NOTE — TELEPHONE ENCOUNTER
----- Message from Melvi Leal sent at 1/14/2021  8:40 AM EST -----  Regarding: Dr. Taylor Reyes Message/Vendor Calls    Caller's first and last name:  Mishel Kay      Reason for call:  Pt received a call from [facility] Scheduling regarding an ultrasound to be scheduled for him. Pt would like to know what the ultrasound is for and also would like to know if the office is working on scheduling him with a cardiologist and pulmonologist on a same day appointment? Callback required yes/no and why:  Yes. Pt has questions regarding appointments.       Best contact number(s):  [phone]      Details to clarify the request:      Melvi Leal     Copy/paste [facility]

## 2021-01-14 NOTE — TELEPHONE ENCOUNTER
Please call patient and inform him of the results of his recent labs. See my lab. Please help the patient to schedule his ultrasound.

## 2021-01-18 NOTE — TELEPHONE ENCOUNTER
Identified patient 2 identifiers verified. Patient aware of test results. Patient was given contact number to [facility]  Scheduling  For ultrasound. Office notes faxed to Dr. Barrera Richard office for  Review for sooner appointment. Appointment is 2/15/21.

## 2021-01-19 ENCOUNTER — HOSPITAL ENCOUNTER (OUTPATIENT)
Dept: ULTRASOUND IMAGING | Age: 61
Discharge: HOME | End: 2021-01-19
Attending: FAMILY MEDICINE
Payer: COMMERCIAL

## 2021-01-19 DIAGNOSIS — R79.89 ABNORMAL LIVER FUNCTION TEST: ICD-10-CM

## 2021-01-19 PROCEDURE — 76700 US EXAM ABDOM COMPLETE: CPT

## 2021-01-25 NOTE — PROGRESS NOTES
Your abdominal ultrasound is normal.  The liver is normal in texture with no masses or other abnormalities.

## 2021-01-26 NOTE — PROGRESS NOTES
I called pt, 2 pt identifiers verified; pt informed per Dr Coleridge Angelucci: Your abdominal ultrasound is normal.  The liver is normal in texture with no masses or other abnormalities. ED team/Resident

## 2021-03-08 NOTE — PROGRESS NOTES
This is patient still having shortness of breath? If the answer is yes and he will need to make an appointment with the pulmonologist.  This pulmonary function test show some mild obstruction. This can be seen with asthma or COPD.

## 2021-04-13 NOTE — MR AVS SNAPSHOT
HUB READ NOTE TO PATIENT. HE SAID HE HAS NO QUESTIONS. PLEASE CALL HIM IF ANYTHING WAS OF A CONCERNING NATURE.  [phone]      Visit Information Date & Time Provider Department Dept. Phone Encounter #  
 2/7/2017 11:30 AM Mervin Mathis, [address]  [id number] Follow-up Instructions Return if symptoms worsen or fail to improve. Upcoming Health Maintenance Date Due DTaP/Tdap/Td series (1 - Tdap) 9/28/1983 INFLUENZA AGE 9 TO ADULT 8/1/2016 COLONOSCOPY 12/18/2017 Allergies as of 2/7/2017  Review Complete On: 2/7/2017 By: Shanae Givens Severity Noted Reaction Type Reactions Pollen Extracts  05/26/2010    Runny Nose Current Immunizations  Reviewed on 6/23/2012 No immunizations on file. Not reviewed this visit You Were Diagnosed With   
  
 Codes Comments Influenza    -  Primary ICD-10-CM: J11.1 ICD-9-CM: 487. 1 Vitals BP Pulse Temp Resp Height(growth percentile) Weight(growth percentile) 109/76 (BP 1 Location: Left arm, BP Patient Position: Sitting) 75 98 °F (36.7 °C) (Oral) 18 6' 1.5\" (1.867 m) 167 lb (75.8 kg) SpO2 BMI Smoking Status 98% 21.73 kg/m2 Never Smoker BMI and BSA Data Body Mass Index Body Surface Area 21.73 kg/m 2 1.98 m 2 Preferred Pharmacy Pharmacy Name Phone [id number] [address] [phone] Your Updated Medication List  
  
   
This list is accurate as of: 2/7/17  1:11 PM.  Always use your most recent med list.  
  
  
  
  
 acetaminophen 500 mg tablet Commonly known as:  TYLENOL Take  by mouth every six (6) hours as needed for Pain. azithromycin 250 mg tablet Commonly known as:  Lynsey Blackbird Take 1 Tab by mouth See Admin Instructions for 5 days. chlorpheniramine-HYDROcodone 10-8 mg/5 mL suspension Commonly known as:  Sally Mcdonnell Take 5 mL by mouth every twelve (12) hours as needed for Cough. Max Daily Amount: 10 mL. FISH OIL PO Take  by mouth. ibuprofen 200 mg tablet Commonly known as:  MOTRIN Take  by mouth. oseltamivir 75 mg capsule Commonly known as:  TAMIFLU Take 1 Cap by mouth two (2) times a day for 5 days. Indications: INFLUENZA Prescriptions Printed Refills  
 chlorpheniramine-HYDROcodone (TUSSIONEX) 10-8 mg/5 mL suspension 0 Sig: Take 5 mL by mouth every twelve (12) hours as needed for Cough. Max Daily Amount: 10 mL. Class: Print Route: Oral  
  
Prescriptions Sent to Pharmacy Refills  
 oseltamivir (TAMIFLU) 75 mg capsule 0 Sig: Take 1 Cap by mouth two (2) times a day for 5 days. Indications: INFLUENZA Class: Normal  
 Pharmacy: [id number]- [address] Ph #: [phone] Route: Oral  
 azithromycin (ZITHROMAX) 250 mg tablet 0 Sig: Take 1 Tab by mouth See Admin Instructions for 5 days. Class: Normal  
 Pharmacy: [facility][id number]- [address] Ph #: [phone] Route: Oral  
  
Follow-up Instructions Return if symptoms worsen or fail to improve. Introducing [facility]! Dear Rosa M Garrison: Thank you for requesting a [email or web address] account. Our records indicate that you already have an active [email or web address] account. You can access your account anytime at [email or web address] Did you know that you can access your hospital and ER discharge instructions at any time in [email or web address]? You can also review all of your test results from your hospital stay or ER visit. Additional Information If you have questions, please visit the Frequently Asked Questions section of the [email or web address] website at [email or web address] Remember, [email or web address] is NOT to be used for urgent needs. For medical emergencies, dial 911. Now available from your iPhone and Android! Please provide this summary of care documentation to your next provider. Your primary care clinician is listed as Bogdan Dennis. If you have any questions after today's visit, please call [phone].

## 2021-07-30 ENCOUNTER — TELEPHONE (OUTPATIENT)
Dept: INTERNAL MEDICINE CLINIC | Age: 61
End: 2021-07-30

## 2021-07-30 NOTE — TELEPHONE ENCOUNTER
Spoke with patient using 2 identifiers. Patient was asking about billing questions related to his visit with  in January. The insurance covered for the abdominal ultra sound but not The PFT and CXR. The diagnosis abnormal liver test for the abdominal  ultra sound was cover. The diagnosis for CXR and PFT SOB and Hx of COVID. Patient was advised to call his insurance to get more information why the CXR and the PFT wasn't covered.

## 2021-07-30 NOTE — TELEPHONE ENCOUNTER
----- Message from Tila Kothari sent at 7/30/2021 12:40 PM EDT -----  Regarding: Dr. Raymundo Mylar: [phone]  General Message/Vendor Calls    Caller's first and last name: Pt.       Reason for call: Needs speak to nurse. Callback required yes/no and why: Yes, call back from nurse. Best contact number(s): [phone]      Details to clarify the request: N/a.       Message from [facility]